# Patient Record
Sex: FEMALE | Race: BLACK OR AFRICAN AMERICAN
[De-identification: names, ages, dates, MRNs, and addresses within clinical notes are randomized per-mention and may not be internally consistent; named-entity substitution may affect disease eponyms.]

---

## 2017-11-25 ENCOUNTER — HOSPITAL ENCOUNTER (EMERGENCY)
Dept: HOSPITAL 62 - ER | Age: 27
Discharge: HOME | End: 2017-11-25
Payer: MEDICAID

## 2017-11-25 VITALS — SYSTOLIC BLOOD PRESSURE: 125 MMHG | DIASTOLIC BLOOD PRESSURE: 81 MMHG

## 2017-11-25 DIAGNOSIS — M54.41: Primary | ICD-10-CM

## 2017-11-25 DIAGNOSIS — F17.210: ICD-10-CM

## 2017-11-25 PROCEDURE — 96372 THER/PROPH/DIAG INJ SC/IM: CPT

## 2017-11-25 PROCEDURE — 99283 EMERGENCY DEPT VISIT LOW MDM: CPT

## 2017-11-25 NOTE — ER DOCUMENT REPORT
ED Neck/Back Problem





- General


Chief Complaint: Back Pain


Stated Complaint: BACK PAIN


Time Seen by Provider: 11/25/17 19:05


Mode of Arrival: Ambulatory


Information source: Patient


Notes: 





27-year-old female presents to ED for complaint of low back pain for last few 

days.  She denies any injury, denies any loss of control of bowel or bladder, 

denies any saddle anesthesia, denies any loss of sensation to the legs.  

Patient has had this pet pain in the past and was given narcotics before she 

moved to [place].  She states it was about a year ago.


TRAVEL OUTSIDE OF THE [place] IN LAST 30 DAYS: No





- HPI


Patient complains to provider of: Pain, Lower back.  No: Injury


Onset: Other


Onset: Chronic


Timing: Still present


Quality of pain: Achy, Sharp


Severity: Severe


Pain Level: 5


Context: Bending


Recent injury: No


Associated symptoms: Like prior neck/back pain, Lower back pain - Radiates to 

right buttocks.  denies: Constipation, Fever, Incontinence, Motor loss, Numbness

/tingling, Radiation to chest, Radiation to leg, Sensory loss, Sweaty, Upper 

back pain


Exacerbated by: Movement of trunk


Relieved by: Nothing


Similar symptoms previously: Yes


Recently seen / treated by doctor: No





- Related Data


Allergies/Adverse Reactions: 


 





No Known Allergies Allergy (Verified 11/25/17 18:08)


 











Past Medical History





- General


Information source: Patient





- Social History


Smoking Status: Current Every Day Smoker


Cigarette use (# per day): Yes


Chew tobacco use (# tins/day): No


Frequency of alcohol use: None


Drug Abuse: None


Lives with: Family


Family History: Arthritis, CAD, CVA, DM, Hyperlipidemia, Hypertension


Patient has suicidal ideation: No


Patient has homicidal ideation: No





- Past Medical History


Cardiac Medical History: Reports: None


Pulmonary Medical History: Reports: None


EENT Medical History: Reports: None


Neurological Medical History: Reports: None


Endocrine Medical History: Reports: None


Renal/ Medical History: Reports: None


Malignancy Medical History: Reports: None


GI Medical History: Reports: None


Musculoskeltal Medical History: Reports Hx Musculoskeletal Deformity, Reports 

Hx Musculoskeletal Trauma


Skin Medical History: Reports Hx Cellulitis


Psychiatric Medical History: Reports: None


Traumatic Medical History: Reports: Hx Fractures


Infectious Medical History: Reports: None


Past Surgical History: Reports: Hx Breast Surgery - Biopsy





- Immunizations


Immunizations up to date: Yes


Hx Diphtheria, Pertussis, Tetanus Vaccination: Yes - 2015





Review of Systems





- Review of Systems


Constitutional: No symptoms reported


EENT: No symptoms reported


Cardiovascular: No symptoms reported


Respiratory: No symptoms reported


Gastrointestinal: No symptoms reported


Genitourinary: No symptoms reported


Female Genitourinary: No symptoms reported


Musculoskeletal: Back pain, Muscle pain, Muscle stiffness


Skin: No symptoms reported


Hematologic/Lymphatic: No symptoms reported


Neurological/Psychological: No symptoms reported





Physical Exam





- Vital signs


Vitals: 


 











Temp Pulse Resp BP Pulse Ox


 


 98.6 F   87   18   135/72 H  99 


 


 11/25/17 18:04  11/25/17 18:04  11/25/17 18:04  11/25/17 18:04  11/25/17 18:04











Interpretation: Normal





- General


General appearance: Appears well, Alert





- HEENT


Head: Normocephalic, Atraumatic


Eyes: Normal


Pupils: PERRL





- Respiratory


Respiratory status: No respiratory distress


Chest status: Nontender


Breath sounds: Normal


Chest palpation: Normal





- Cardiovascular


Rhythm: Regular


Heart sounds: Normal auscultation


Murmur: No





- Abdominal


Inspection: Normal


Distension: No distension


Bowel sounds: Normal


Tenderness: Nontender


Organomegaly: No organomegaly





- Back


Back: Normal, Tender.  No: Deformity/step-off, CVA tenderness, Vertebra 

tenderness, Scars, Scoliosis, Wounds





- Extremities


General upper extremity: Normal inspection, Nontender, Normal color, Normal ROM

, Normal temperature


General lower extremity: Normal inspection, Nontender, Normal color, Normal ROM

, Normal temperature, Normal weight bearing.  No: Juliana's sign





- Neurological


Neuro grossly intact: Yes


Cognition: Normal


Orientation: AAOx4


[place] Coma Scale Eye Opening: Spontaneous


[place] Coma Scale Verbal: Oriented


[place] Coma Scale Motor: Obeys Commands


[place] Coma Scale Total: 15


Speech: Normal


Motor strength normal: LUE, RUE, LLE, RLE


Sensory: Normal





- Psychological


Associated symptoms: Normal affect, Normal mood





- Skin


Skin Temperature: Warm


Skin Moisture: Dry


Skin Color: Normal





Course





- Re-evaluation


Re-evalutation: 





11/25/17 19:25


Patient denies any loss control of bowel bladder, loss of control of legs, no 

saddle anesthesia, no decreased sensation to the leg.  Patient denies any signs 

and symptoms of cauda equina.  Patient has had similar pain in the past before 

moving to [place] stated they always gave her pain medicine.  I have 

reviewed the pain medicine policy with the patient and have given her Toradol 

and Decadron injections and a Lidoderm patch in the emergency room and 

instructed her to follow-up with primary doctor and back specialist for her 

pain.  Patient discharged home with prescription for ibuprofen.





- Vital Signs


Vital signs: 


 











Temp Pulse Resp BP Pulse Ox


 


 98.3 F   87   18   125/81   99 


 


 11/25/17 19:46  11/25/17 19:46  11/25/17 18:04  11/25/17 19:46  11/25/17 19:46














Discharge





- Discharge


Clinical Impression: 


Low back pain


Qualifiers:


 Chronicity: chronic Back pain laterality: right Sciatica presence: with 

sciatica Sciatica laterality: sciatica of right side Qualified Code(s): M54.41 

- Lumbago with sciatica, right side





Condition: Stable


Disposition: HOME, SELF-CARE


Instructions:  Family Physicians / Practices


Additional Instructions: 


LOW BACK PAIN:





     Three out of every four people will have an episode of disabling back pain 

during their lifetime.  Most commonly the pain is due to straining of the 

muscles and ligaments in the low back.


     Usual treatment includes: 


(1) Rest on a firm surface.  Avoid lying on your stomach.  


(2) Ice pack the painful area.  After a few days, gentle heat may be used 

intermittently to relax the area, or ice packs can be continued.  


(3) Medication may be needed -- muscle relaxers and antiinflammatory medicines 

are commonly used.  


(4) As the back improves, exercises are prescribed to strengthen the back and 

abdominal muscles.


     Your doctor will advise you on the proper care for your back at each stage 

in your recovery.  You may be better in a few days -- or healing may take 

several weeks.


     If new symptoms of a "herniated disc" (radiation of pain, numbness, or 

tingling down the back of the leg or weakness in the leg) occur, you should be 

re-examined.  Further testing may be necessary.





Toradol Injection





     You have been given an injection of ketorolac tromethamine (Toradol).  

This is an excellent, safe drug for pain control.  It also has potent 

antiinflammatory action.  You should have significant pain relief within about 

one hour.


     Toradol is not addicting and is non-sedating.  It does not interfere with 

driving or work.


     Call or return if you develop itching, hives, shortness of breath, or rash.





STEROID MEDICATION:





     You have been given an injection of medicine of the cortisone/steroid 

class.  This medication is used to control inflammation or allergy.  It is 

often continued as a pill for a short period of time, until the acute process 

subsides.


     There are usually no side effects from short-term use of cortisone-like 

medications.  Some persons feel an increased sense of well-being and are not 

sleepy at bedtime.  Long-term use of cortisone medications is best avoided, 

unless required for a severe condition.  If your condition does not remit, or 

relapses after the course of corticosteroid medication, you should consult your 

physician.





Stretching Soaks





     You have been instructed to begin range-of-motion exercises.  This 

activity stretches the skin as it heals.  Stretching soaks reduce stiffness 

from scar tissue.  Perform the exercises twice each day.


     Soak the wound area in a container of warm epsom salt water.  Use clean 

hot tap water (about the temperature of a very warm bath), mixing in about one (

1) teaspoon for every pint of water.  For example, a one-gallon pan half full 

of water needs four teaspoons (1/2 gal = 2 quarts = 4 pints).


     Soak the wound for about 10 minutes to numb it and soften any crusting.  

Then begin gently moving the joints through the normal direction of motion.  

Move back and forth, encouraging the joint to bend further and further, until 

you reach the point where you feel pain.  Do not try to push the joint beyond 

the point where you feel pain.


     After completing the exercises, let the wound dry briefly.  Apply a fresh 

dressing.








ICE PACKS:


     Apply ice packs frequently against the painful area.  Many different 

schedules are recommended, such as "20 minutes on, 20 minutes off" or "one hour 

ice, two hours rest."  If you need to work, you may need to go longer between 

ice treatments.  You should plan to have the area ice packed AT LEAST one 

fourth of the time.


     The ice should be applied over the wrap, tape, or splint, or over a layer 

of cloth -- not directly against the skin.  Some ice bags have a built-in cloth 

and can be put directly on the skin.





WARM PACKS:


     After approximately two days, apply gentle heat (such as a heating pad or 

hot water bottle) for about 20 to 30 minutes about every two hours -- at least 

four times daily.  Warmth and elevation will help you make a more rapid recovery

, and will ease the pain considerably.


     Do not use HOT heat, and never apply heat for longer than 30 minutes.  The 

continuous heat can invisibly damage skin and muscles -- even when no burn is 

seen on the surface.  Damaged muscles can make you MORE sore.





Use over-the-counter Lidoderm patches or Aspercreme to your back to help 

relieve the pain.  Please follow-up with your primary doctor as soon as he gets 

her Medicaid card and then schedule a pain management or a back specialist to 

continue to treat your back pain.








FOLLOW-UP CARE:


If you have been referred to a physician for follow-up care, call the physician

s office for an appointment as you were instructed or within the next two days.

  If you experience worsening or a significant change in your symptoms, notify 

the physician immediately or return to the Emergency Department at any time for 

re-evaluation.


Prescriptions: 


Ibuprofen 800 mg PO Q8HP PRN #20 tablet


 PRN Reason: 


Forms:  Elevated Blood Pressure, Smoking Cessation Education


Referrals: 


GARLAND COOK MD [ASSOCIATE] - Follow up as needed

## 2018-01-16 ENCOUNTER — HOSPITAL ENCOUNTER (EMERGENCY)
Dept: HOSPITAL 62 - ER | Age: 28
Discharge: HOME | End: 2018-01-16
Payer: MEDICAID

## 2018-01-16 VITALS — DIASTOLIC BLOOD PRESSURE: 82 MMHG | SYSTOLIC BLOOD PRESSURE: 116 MMHG

## 2018-01-16 DIAGNOSIS — M54.40: Primary | ICD-10-CM

## 2018-01-16 DIAGNOSIS — M79.604: ICD-10-CM

## 2018-01-16 DIAGNOSIS — G89.29: ICD-10-CM

## 2018-01-16 DIAGNOSIS — F17.200: ICD-10-CM

## 2018-01-16 DIAGNOSIS — R20.0: ICD-10-CM

## 2018-01-16 DIAGNOSIS — M79.605: ICD-10-CM

## 2018-01-16 PROCEDURE — 96372 THER/PROPH/DIAG INJ SC/IM: CPT

## 2018-01-16 PROCEDURE — 81025 URINE PREGNANCY TEST: CPT

## 2018-01-16 PROCEDURE — 72110 X-RAY EXAM L-2 SPINE 4/>VWS: CPT

## 2018-01-16 PROCEDURE — 99284 EMERGENCY DEPT VISIT MOD MDM: CPT

## 2018-01-16 NOTE — RADIOLOGY REPORT (SQ)
EXAM DESCRIPTION:  L SPINE WHOLE



COMPLETED DATE/TIME:  1/16/2018 5:40 pm



REASON FOR STUDY:  Lumbar spinal tenderness L1-L3 x 3 days w/ n/t lgs



COMPARISON:  None.



NUMBER OF VIEWS:  Five views including obliques.



TECHNIQUE:  AP, lateral, oblique, and sacral radiographic images acquired of the lumbar spine.



LIMITATIONS:  None.



FINDINGS:  MINERALIZATION: Normal.

SEGMENTATION: Transition vertebra is identified at the lumbar sacral junction

ALIGNMENT: Normal.

VERTEBRAE: Maintained height.  No fracture or worrisome bone lesion.

DISCS: Preserved height.  No significant osteophytes or end plate irregularity.

POSTERIOR ELEMENTS: Pedicles and facets are intact.  No pars defect or posterior arch defects.

HARDWARE: None in the spine.

PARASPINAL SOFT TISSUES: Normal.

PELVIS: Intact as visualized. No fractures or worrisome bone lesions. SI joints intact.

OTHER: IUD is projected in the mid pelvis



IMPRESSION:  No significant vertebral compression or disc space reduction is seen.  A transition vert
ebra is identified at the lumbosacral junction.  Other findings as noted above



TECHNICAL DOCUMENTATION:  JOB ID:  [id number]

 2011 [employer]- All Rights Reserved

## 2018-01-16 NOTE — ER DOCUMENT REPORT
ED General





- General


Chief Complaint: Low Back Pain


Stated Complaint: LOWER BACK PAIN


Time Seen by Provider: 01/16/18 16:00


Mode of Arrival: Ambulatory


Information source: Patient


TRAVEL OUTSIDE OF THE [place] IN LAST 30 DAYS: No





- HPI


Notes: 





27-year-old female presents today with complaints of an acute exacerbation of 

chronic back pain.  Patient states pain is 9 out of 10, sharp and shooting, 

constant.  Reports she has some numbness and tingling down bilateral lower 

extremities that does not extend past the knee.  Denies any issues with loss of 

control of bowel or bladder, denies any saddle anesthesia.  Patient states she 

has had a history of chronic back pain for the last 2 years since she had an 

epidural with her son.  States her lower back pain is never been managed when 

she was in [place], states she moved here, has not received a referral to a 

primary care or orthopedic specialist, although a referral was given for Dr. Sosa Hernandez MD on 11/25/17.  Patient never followed up on this.  Has any 

chest pain, shortness of breath, nausea, vomiting, diarrhea, pelvic pain, 

vaginal pain.  Denies any dysuria symptoms.





- Related Data


Allergies/Adverse Reactions: 


 





No Known Allergies Allergy (Verified 01/16/18 16:34)


 











Past Medical History





- General


Information source: Patient





- Social History


Smoking Status: Current Every Day Smoker


Family History: Arthritis, CAD, CVA, DM, Hyperlipidemia, Hypertension


Renal/ Medical History: Denies: Hx Ectopic Pregnancy, Hx Kidney Stones, Hx 

Ovarian Cysts, Hx Peritoneal Dialysis, Hx Pelvic Inflammatory Disease


Musculoskeltal Medical History: Reports Hx Musculoskeletal Deformity, Reports 

Hx Musculoskeletal Trauma


Skin Medical History: Reports Hx Cellulitis


Traumatic Medical History: Reports: Hx Fractures


Past Surgical History: Reports: Hx Breast Surgery - Biopsy





- Immunizations


Immunizations up to date: Yes


Hx Diphtheria, Pertussis, Tetanus Vaccination: Yes - 2015





Review of Systems





- Review of Systems


Constitutional: No symptoms reported


EENT: No symptoms reported


Cardiovascular: No symptoms reported


Respiratory: No symptoms reported


Gastrointestinal: No symptoms reported


Genitourinary: No symptoms reported


Female Genitourinary: No symptoms reported


Musculoskeletal: See HPI


Skin: No symptoms reported


Hematologic/Lymphatic: No symptoms reported


Neurological/Psychological: No symptoms reported





Physical Exam





- Vital signs


Vitals: 


 











Temp Pulse Resp BP Pulse Ox


 


 98.7 F   76   18   123/80   98 


 


 01/16/18 14:17  01/16/18 14:17  01/16/18 14:17  01/16/18 14:17  01/16/18 14:17











Interpretation: Normal





- Notes


Notes: 





PHYSICAL EXAMINATION:





GENERAL: Well-appearing, well-nourished and in no acute distress.





HEAD: Atraumatic, normocephalic.





EYES: Pupils equal round and reactive to light, extraocular movements intact, 

conjunctiva are normal.





ENT: Nares patent, oropharynx clear without exudates.  Moist mucous membranes.





NECK: Normal range of motion, supple without lymphadenopathy





LUNGS: Breath sounds clear to auscultation bilaterally and equal.  No wheezes 

rales or rhonchi.





HEART: Regular rate and rhythm without murmurs





ABDOMEN: Soft, nontender, nondistended abdomen.  No guarding, no rebound.  No 

masses appreciated.





Female : deferred





Musculoskeletal: Normal range of motion, no pitting or edema.  No cyanosis. 

straight leg test negative. Pain with flexion and extension at 30 degrees. 

Normal hip rotation. DTR +2 in BLE equally. Normal motor and sensory function 

in BLE equally. Distal pulses + 2 BLE equally. Noted paraspinal tenderness near 

L2 and L3. Noted spinal tenderness from L1-L3. No CVA tenderness bilaterally. 

Femoral pulses + 2 bilaterally and equally. No abrasions, scars, lacerations, 

ecchymosis of any recent trauma.





NEUROLOGICAL: Cranial nerves grossly intact.  Normal speech, normal gait.  

Normal sensory, motor exams





PSYCH: Normal mood, normal affect.





SKIN: Warm, Dry, normal turgor, no rashes or lesions noted.





Course





- Re-evaluation


Re-evalutation: 








Rechecked the patient who is resting comfortably. On re-exam, patient is 

symptomatically improved. Discussed the results of the labs/radiology as well 

as the diagnosis at great length.  The importance of following up with an 

orthopedic specialist, no acute fracture dislocation was noted.  Discussed the 

need to return to the ER for any new or worsening sx. Patient understands to 

take the Rx as directed. All questions answered. Patient comfortable with the 

decision to go home.





01/16/18 23:20








- Vital Signs


Vital signs: 


 











Temp Pulse Resp BP Pulse Ox


 


 97.8 F   71   16   116/82   99 


 


 01/16/18 18:06  01/16/18 18:06  01/16/18 18:06  01/16/18 18:06  01/16/18 18:06














Discharge





- Discharge


Clinical Impression: 


Acute bilateral low back pain


Qualifiers:


 Sciatica presence: with sciatica Sciatica laterality: sciatica laterality 

unspecified Qualified Code(s): M54.40 - Lumbago with sciatica, unspecified side





Condition: Good


Disposition: HOME, SELF-CARE


Instructions:  Low Back Pain ([facility]), Muscle Strain ([facility])


Additional Instructions: 


RICE therapy. prescribed Flexeril and meloxicam, advised pt not to take 

Flexeril while driving, operating heavy machinery or drinking etoh. Advised pt 

not to mix meloxicam with any other NSAID and to take with food. Discussed 

stretching exercises and how to manage sprains. 


and Tylenol on a staggered schedule 2 day   LOW BACK PAIN:





     Three out of every four people will have an episode of disabling back pain 

during their lifetime.  Most commonly the pain is due to straining of the 

muscles and ligaments in the low back.


     Usual treatment includes: 


(1) Rest on a firm surface.  Avoid lying on your stomach.  


(2) Ice pack the painful area.  After a few days, gentle heat may be used 

intermittently to relax the area, or ice packs can be continued.  


(3) Medication may be needed -- muscle relaxers and antiinflammatory medicines 

are commonly used.  


(4) As the back improves, exercises are prescribed to strengthen the back and 

abdominal muscles.


     Your doctor will advise you on the proper care for your back at each stage 

in your recovery.  You may be better in a few days -- or healing may take 

several weeks.


     If new symptoms of a "herniated disc" (radiation of pain, numbness, or 

tingling down the back of the leg or weakness in the leg) occur, you should be 

re-examined.  Further testing may be necessary.








PAIN MEDICATION INJECTION:


     You have received an injection of a pain medication.  You should 

experience significant pain relief within 45 minutes.  If this injection was a 

narcotic -- it will impair your judgement, slow your reaction time and make you 

sleepy (as well as relieve your pain).  Narcotics also can cause nausea.


     You should not drive, work with machinery, or perform any task requiring 

mental alertness until all effects of the medication are gone -- six to eight 

hours.  Do not take any alcohol, or sedatives, and do not take any other 

medication without checking with your physician.











MUSCLE RELAXERS: 


     Muscle relaxing medications are usually prescribed for acute muscle spasm 

or injury to the neck and back.  They are often combined with antiinflammatory 

pain medication for increased relief.


     You may stop the muscle relaxer when the pain and stiffness have improved.

  Start the medication again if spasms recur.  


     Muscle relaxers may cause drowsiness, especially with the first dose.  Do 

not operate machinery or drive while under the effects of the medication.  Most 

muscle relaxers last up to 24 hours.  Do not combine the medication with 

alcohol.








ICE PACKS:


     Apply ice packs frequently against the painful area.  Many different 

schedules are recommended, such as "20 minutes on, 20 minutes off" or "one hour 

ice, two hours rest."  If you need to work, you may need to go longer between 

ice treatments.  You should plan to have the area ice packed AT LEAST one 

fourth of the time.


     The ice should be applied over the wrap, tape, or splint, or over a layer 

of cloth -- not directly against the skin.  Some ice bags have a built-in cloth 

and can be put directly on the skin.





WARM PACKS:


     After approximately two days, apply gentle heat (such as a heating pad or 

hot water bottle) for about 20 to 30 minutes about every two hours -- at least 

four times daily.  Warmth and elevation will help you make a more rapid recovery

, and will ease the pain considerably.


     Do not use HOT heat, and never apply heat for longer than 30 minutes.  The 

continuous heat can invisibly damage skin and muscles -- even when no burn is 

seen on the surface.  Damaged muscles can make you MORE sore.








FOLLOW-UP CARE:


If you have been referred to a physician for follow-up care, call the physician

s office for an appointment as you were instructed or within the next two days.

  If you experience worsening or a significant change in your symptoms, notify 

the physician immediately or return to the Emergency Department at any time for 

re-evaluation.Chronic Pain Control





     Stress, inactivity, and depression make pain more severe regardless of the 

cause of the pain.  Stress and poor physical condition can cause pain such as 

headaches and backache.


     Relaxation:  Rest in a quiet place with your eyes closed for 20 minutes 

twice daily.  Concentrate on a pleasant image, or simply "feel" your breathing.

  Clear your mind.


     Stress management:  Deal with your "stressors."  Either take action, or 

eliminate the stressor from your life.  Don't let things hang over you.  Accept 

those things you can't change.


     Nutrition:  Eat small, balanced meals -- don't skip, don't overeat.  Meals 

should be high-carbohydrate, low-sugar, low-fat.


     Exercise:  Exercise helps painful conditions and eases stress. Get 30 

minutes of moderate exercise, five days a week. Do an activity that does not 

flare your pain.


     Precautions:  Pain which continues to disrupt daily activities, or which 

changes in nature, requires a medical evaluation.  Pain Clinic referral is 

available.





     


We do not manage chronic pain in the Emergency Department.  We will try to 

appropriately help you through an acute flare of your chronic painful condition

, but for on-going chronic pain that does not improve, you will need to see 

your private doctor or pain management specialist.  We do not provide repeated 

medication management of chronic painful conditions.  If you wish, we can 

provide the name of local pain management physicians.








Orthopedic Office


[facility]


[address] [place] [address]   


phone: [phone]








 advised to return to the ER if any signs or symptoms became worse.  Take over-

the-counter Motrin and Tylenol as needed for any fevers or pain.  Follow up 

with primary care within 1-2 days.  All questions and concerns answered by this 

provider. Patient/family  states would follow plan of care and agreed to plan 

of care. Patient was discharged home and off unit without incident. Please 

excuse any errors in this document was done by dragon dictation.








Prescriptions: 


Cyclobenzaprine HCl [Flexeril 10 mg Tablet] 10 mg PO TIDP PRN #9 tab


 PRN Reason: 


Referrals: 


RUDI ALBERTO DO [NO LOCAL MD] - Follow up as needed


CHERYL BRICENO MD [ACTIVE STAFF] - Follow up as needed

## 2018-05-16 ENCOUNTER — HOSPITAL ENCOUNTER (EMERGENCY)
Dept: HOSPITAL 62 - ER | Age: 28
Discharge: HOME | End: 2018-05-16
Payer: MEDICAID

## 2018-05-16 VITALS — SYSTOLIC BLOOD PRESSURE: 140 MMHG | DIASTOLIC BLOOD PRESSURE: 80 MMHG

## 2018-05-16 DIAGNOSIS — M54.42: Primary | ICD-10-CM

## 2018-05-16 PROCEDURE — 99283 EMERGENCY DEPT VISIT LOW MDM: CPT

## 2018-05-16 NOTE — ER DOCUMENT REPORT
ED General





- General


Chief Complaint: Low Back Pain


Stated Complaint: BACK PAIN


Time Seen by Provider: 05/16/18 22:45


Notes: 


Patient is a 27-year-old female who presents with complaint of back pain that 

is on the left side of her back.  She said she has had this pain for a long 

time and occasionally gets exacerbations of the pain which she is currently 

having.  No recent new traumas or injuries.  No leg weakness.  No leg numbness.

  No loss of bowel control.  No urinary retention.  No fevers.  No other 

complaints at this time.





TRAVEL OUTSIDE OF THE [place] IN LAST 30 DAYS: No





- Related Data


Allergies/Adverse Reactions: 


 





No Known Allergies Allergy (Verified 01/16/18 16:34)


 











Past Medical History





- Social History


Smoking Status: Never Smoker


Frequency of alcohol use: None


Drug Abuse: None


Family History: Arthritis, CAD, CVA, DM, Hyperlipidemia, Hypertension


Renal/ Medical History: Denies: Hx Ectopic Pregnancy, Hx Kidney Stones, Hx 

Ovarian Cysts, Hx Peritoneal Dialysis, Hx Pelvic Inflammatory Disease


Musculoskeltal Medical History: Reports Hx Musculoskeletal Deformity, Reports 

Hx Musculoskeletal Trauma


Skin Medical History: Reports Hx Cellulitis


Traumatic Medical History: Reports: Hx Fractures


Past Surgical History: Reports: Hx Breast Surgery - Biopsy





- Immunizations


Immunizations up to date: Yes


Hx Diphtheria, Pertussis, Tetanus Vaccination: Yes - 2015





Review of Systems





- Review of Systems


Notes: 





My Normal Review Basic





REVIEW OF SYSTEMS:


CONSTITUTIONAL :  Denies fever,  chills, or sweats.  Denies recent illness.


FEMALE  GENITOURINARY:  Denies vaginal bleeding, abnormal or irregular periods.

  


MUSCULOSKELETAL: Low back pain


SKIN:   Denies rash or skin lesions.


NEUROLOGICAL: Denies sensory or motor loss.


ALL OTHER SYSTEMS REVIEWED AND NEGATIVE.





Physical Exam





- Vital signs


Vitals: 


 











Temp Pulse Resp BP Pulse Ox


 


 98.6 F   104 H  18   140/80 H  98 


 


 05/16/18 22:24  05/16/18 22:24  05/16/18 22:24  05/16/18 22:24  05/16/18 22:24














- Notes


Notes: 





General Appearance: Well nourished, alert, cooperative, no acute distress, mild 

obvious discomfort.


Vitals: reviewed, See vital signs table.


Head: no swelling or tenderness to the head


Eyes: PERRL, EOMI, Conjuctiva clear


Back: Pain to palpation over the left lumbar paraspinal musculature.  No 

midline tenderness.  No pain to the right side.  Strength with plantar 

dorsiflexion against resistance.  Good distal sensation in the lower 

extremities.  Patellar reflexes are 1 out of 4 and equal bilaterally.


Extremities: strength 5/5 in all extremities, good pulses in all extremities, 

no swelling or tenderness in the extremities, no edema.


Skin: warm, dry, appropriate color, no rash


Neuro: speech clear, oriented x 3, normal affect, responds appropriately to 

questions.





Course





- Re-evaluation


Re-evalutation: 





05/16/18 23:00


Patient has acute exacerbation of her chronic low back pain.  She does not have 

any signs of spinal cord impingement.  I feel that she is safe to be discharged 

home.  I will give her a shot of Toradol and placed on Flexeril.  I will refer 

her to Dr. Perez for evaluation for further options such as physical therapy 

or injections.  Encouraged her return to ER medially if she has any signs of 

cauda equina syndrome, worsening pain, or she feels unwell.  Patient agrees 

with the plan will be discharged home.





Dictation of this chart was performed using voice recognition software; 

therefore, there may be some unintended grammatical errors.





- Vital Signs


Vital signs: 


 











Temp Pulse Resp BP Pulse Ox


 


 98.6 F   104 H  18   140/80 H  98 


 


 05/16/18 22:24  05/16/18 22:24  05/16/18 22:24  05/16/18 22:24  05/16/18 22:24














Discharge





- Discharge


Clinical Impression: 


Back pain


Qualifiers:


 Back pain location: low back pain Chronicity: chronic Back pain laterality: 

left Sciatica presence: with sciatica Sciatica laterality: sciatica of left 

side Qualified Code(s): M54.42 - Lumbago with sciatica, left side





Condition: Good


Disposition: HOME, SELF-CARE


Additional Instructions: 


LOW BACK PAIN:





     Three out of every four people will have an episode of disabling back pain 

during their lifetime.  Most commonly the pain is due to straining of the 

muscles and ligaments in the low back.


     Usual treatment includes: 


(1) Rest on a firm surface.  Avoid lying on your stomach.  


(2) Ice pack the painful area.  After a few days, gentle heat may be used 

intermittently to relax the area, or ice packs can be continued.  


(3) Medication may be needed -- muscle relaxers and antiinflammatory medicines 

are commonly used.  


(4) As the back improves, exercises are prescribed to strengthen the back and 

abdominal muscles.


     Your doctor will advise you on the proper care for your back at each stage 

in your recovery.  You may be better in a few days -- or healing may take 

several weeks.


     If new symptoms of a "herniated disc" (radiation of pain, numbness, or 

tingling down the back of the leg or weakness in the leg) occur, you should be 

re-examined.  Further testing may be necessary.








MUSCLE RELAXERS: 


     Muscle relaxing medications are usually prescribed for acute muscle spasm 

or injury to the neck and back.  They are often combined with antiinflammatory 

pain medication for increased relief.


     You may stop the muscle relaxer when the pain and stiffness have improved.

  Start the medication again if spasms recur.  


     Muscle relaxers may cause drowsiness, especially with the first dose.  Do 

not operate machinery or drive while under the effects of the medication.  Most 

muscle relaxers last up to 24 hours.  Do not combine the medication with 

alcohol.








ICE PACKS:


     Apply ice packs frequently against the painful area.  Many different 

schedules are recommended, such as "20 minutes on, 20 minutes off" or "one hour 

ice, two hours rest."  If you need to work, you may need to go longer between 

ice treatments.  You should plan to have the area ice packed AT LEAST one 

fourth of the time.


     The ice should be applied over the wrap, tape, or splint, or over a layer 

of cloth -- not directly against the skin.  Some ice bags have a built-in cloth 

and can be put directly on the skin.





WARM PACKS:


     After approximately two days, apply gentle heat (such as a heating pad or 

hot water bottle) for about 20 to 30 minutes about every two hours -- at least 

four times daily.  Warmth and elevation will help you make a more rapid recovery

, and will ease the pain considerably.


     Do not use HOT heat, and never apply heat for longer than 30 minutes.  The 

continuous heat can invisibly damage skin and muscles -- even when no burn is 

seen on the surface.  Damaged muscles can make you MORE sore.








FOLLOW-UP CARE:


If you have been referred to a physician for follow-up care, call the physician

s office for an appointment as you were instructed or within the next two days.

  If you experience worsening or a significant change in your symptoms, notify 

the physician immediately or return to the Emergency Department at any time for 

re-evaluation.








Please take Motrin 600 mg every 6 hours and Tylenol 500 mg every 4 hours.  This 

will help reduce inflammation in your back.  Please take the muscle relaxer as 

needed.  The muscle relaxer may make you little bit sleepy so please try not to 

drive when taking this medication.  Please follow-up with the pain management 

specialist, Dr. Jung, talked about further treatment options such as 

physical therapy or possible back injections if he feels that they will help 

after he evaluates you.  Please return to ER immediately if you have loss of 

bowel control, inability to urinate, weakness or numbness into her leg, or if 

your pain is intractable.


Prescriptions: 


Cyclobenzaprine HCl [Flexeril 10 mg Tablet] 10 mg PO TIDP PRN #15 tab


 PRN Reason: 


Forms:  Return to Work


Referrals: 


MATY PEREZ MD [ACTIVE STAFF] - Follow up in 3-5 days

## 2018-05-25 ENCOUNTER — HOSPITAL ENCOUNTER (EMERGENCY)
Dept: HOSPITAL 62 - ER | Age: 28
Discharge: HOME | End: 2018-05-25
Payer: MEDICAID

## 2018-05-25 VITALS — DIASTOLIC BLOOD PRESSURE: 81 MMHG | SYSTOLIC BLOOD PRESSURE: 128 MMHG

## 2018-05-25 DIAGNOSIS — M54.5: ICD-10-CM

## 2018-05-25 DIAGNOSIS — G89.29: Primary | ICD-10-CM

## 2018-05-25 DIAGNOSIS — F17.210: ICD-10-CM

## 2018-05-25 PROCEDURE — 99406 BEHAV CHNG SMOKING 3-10 MIN: CPT

## 2018-05-25 PROCEDURE — 99283 EMERGENCY DEPT VISIT LOW MDM: CPT

## 2018-05-25 NOTE — ER DOCUMENT REPORT
ED Neck/Back Problem





- General


Chief Complaint: Back Pain


Stated Complaint: BACK PAIN


Time Seen by Provider: 05/25/18 17:27


Mode of Arrival: Ambulatory


Information source: Patient


Notes: 





27-year-old female presented to ED for complaint of back pain.  She has the 

same back pain that she has had for over a year.  She states she has not 

injured herself she is not fallen she has no change in her pain.  She states 

sometimes when she gets up she has pain that makes her sit back down.  She 

denies falling at any time.  He denies any loss of sensation to the saddle 

region, she denies any loss of control of bowel bladder, she denies any loss 

control of her legs.  She can walk with a even steady gait.


TRAVEL OUTSIDE OF THE [place] IN LAST 30 DAYS: No





- HPI


Patient complains to provider of: Pain, Lower back


Onset: Other - Patient has had this pain off and on for over a year


Onset: Chronic


Timing: Waxing and waning, Still present


Quality of pain: Sharp


Severity: Moderate


Pain Level: 3


Context: Bending, Lifting


Recent injury: No


Associated symptoms: Like prior neck/back pain, Lower back pain.  denies: 

Constipation, Motor loss, Numbness/tingling, Radiation to arm, Radiation to 

chest, Radiation to leg, Sensory loss, Sweaty, Unable to urinate


Exacerbated by: Movement of trunk


Relieved by: Nothing


Similar symptoms previously: Yes


Recently seen / treated by doctor: Yes





- Related Data


Allergies/Adverse Reactions: 


 





No Known Allergies Allergy (Verified 05/25/18 17:23)


 











Past Medical History





- General


Information source: Patient





- Social History


Smoking Status: Current Every Day Smoker


Cigarette use (# per day): Yes - 1/2 ppd


Chew tobacco use (# tins/day): No


Smoking Education Provided: Yes - 4 min


Frequency of alcohol use: Social - 2 x a week


Drug Abuse: None


Occupation: call center


Family History: Arthritis, CAD, CVA, DM, Hyperlipidemia, Hypertension


Patient has suicidal ideation: No


Patient has homicidal ideation: No





- Past Medical History


Cardiac Medical History: Reports: None


Pulmonary Medical History: Reports: None


EENT Medical History: Reports: None


Neurological Medical History: Reports: None


Endocrine Medical History: Reports: None


Renal/ Medical History: Reports: None


Malignancy Medical History: Reports: None


GI Medical History: Reports: None


Musculoskeltal Medical History: Reports Hx Musculoskeletal Deformity, Reports 

Hx Musculoskeletal Trauma - ankle fracture


Skin Medical History: Reports Hx Cellulitis


Psychiatric Medical History: Reports: None


Traumatic Medical History: Reports: Hx Fractures - ankle


Infectious Medical History: Reports: None


Past Surgical History: Reports: Hx Breast Surgery - Biopsy





- Immunizations


Immunizations up to date: Yes


Hx Diphtheria, Pertussis, Tetanus Vaccination: Yes - 2015





Review of Systems





- Review of Systems


Constitutional: No symptoms reported


EENT: No symptoms reported


Cardiovascular: No symptoms reported


Respiratory: No symptoms reported


Gastrointestinal: No symptoms reported


Genitourinary: No symptoms reported


Female Genitourinary: No symptoms reported


Musculoskeletal: Back pain, Muscle pain, Muscle stiffness


Skin: No symptoms reported


Hematologic/Lymphatic: No symptoms reported


Neurological/Psychological: No symptoms reported





Physical Exam





- Vital signs


Vitals: 





 











Temp Pulse Resp BP Pulse Ox


 


 98.6 F   108 H  18   128/81 H  97 


 


 05/25/18 17:21  05/25/18 17:21  05/25/18 17:21  05/25/18 17:21  05/25/18 17:21











Interpretation: Normal





- General


General appearance: Appears well, Alert





- HEENT


Head: Normocephalic, Atraumatic


Eyes: Normal


Pupils: PERRL





- Respiratory


Respiratory status: No respiratory distress


Chest status: Nontender


Breath sounds: Normal


Chest palpation: Normal





- Cardiovascular


Rhythm: Regular


Heart sounds: Normal auscultation


Murmur: No





- Abdominal


Inspection: Normal


Distension: No distension


Bowel sounds: Normal


Tenderness: Nontender


Organomegaly: No organomegaly





- Back


Back: Normal, Tender.  No: Deformity/step-off, CVA tenderness, Vertebra 

tenderness, Scars, Scoliosis





- Extremities


General upper extremity: Normal inspection, Nontender, Normal color, Normal ROM

, Normal temperature


General lower extremity: Normal inspection, Nontender, Normal color, Normal ROM

, Normal temperature, Normal weight bearing.  No: Juliana's sign





- Neurological


Neuro grossly intact: Yes


Cognition: Normal


Orientation: AAOx4


Shefali Coma Scale Eye Opening: Spontaneous


Theodore Coma Scale Verbal: Oriented


Shefali Coma Scale Motor: Obeys Commands


Theodore Coma Scale Total: 15


Speech: Normal


Motor strength normal: LUE, RUE, LLE, RLE


Sensory: Normal





- Psychological


Associated symptoms: Normal affect, Normal mood





- Skin


Skin Temperature: Warm


Skin Moisture: Dry


Skin Color: Normal





Course





- Re-evaluation


Re-evalutation: 





05/25/18 17:54


After performing a Medical Screening Examination, I estimate there is LOW risk 

for EXPANDING OR RUPTURED ABDOMINAL AORTIC ANEURYSM, CAUDA EQUINA SYNDROME, 

EPIDURAL MASS LESION, or HERNIATED DISK CAUSING SEVERE SPINAL STENOSIS, thus I 

consider the discharge disposition reasonable.  I have reevaluated this patient 

multiple times and no significant life threatening changes are noted. The 

patient  and I have discussed the diagnosis and risks, and we agree with 

discharging home and close follow-up. We also discussed returning to the 

Emergency Department immediately if new or worsening symptoms occur with the 

understanding that symptoms and presentations can change. We have discussed the 

symptoms which are most concerning (e.g., saddle anesthesia, urinary or bowel 

incontinence or retention, changing or worsening pain) that necessitate 

immediate return.





- Vital Signs


Vital signs: 





 











Temp Pulse Resp BP Pulse Ox


 


 98.6 F   108 H  18   128/81 H  97 


 


 05/25/18 17:21  05/25/18 17:21  05/25/18 17:21  05/25/18 17:21  05/25/18 17:21














Discharge





- Discharge


Clinical Impression: 


Chronic low back pain


Qualifiers:


 Back pain laterality: unspecified Sciatica presence: without sciatica 

Qualified Code(s): M54.5 - Low back pain; G89.29 - Other chronic pain; G89.29 - 

Other chronic pain





Condition: Stable


Disposition: HOME, SELF-CARE


Instructions:  Family Physicians / Practices


Additional Instructions: 


LOW BACK PAIN:





     Three out of every four people will have an episode of disabling back pain 

during their lifetime.  Most commonly the pain is due to straining of the 

muscles and ligaments in the low back.


     Usual treatment includes: 


(1) Rest on a firm surface.  Avoid lying on your stomach.  


(2) Ice pack the painful area.  After a few days, gentle heat may be used 

intermittently to relax the area, or ice packs can be continued.  


(3) Medication may be needed -- muscle relaxers and antiinflammatory medicines 

are commonly used.  


(4) As the back improves, exercises are prescribed to strengthen the back and 

abdominal muscles.


     Your doctor will advise you on the proper care for your back at each stage 

in your recovery.  You may be better in a few days -- or healing may take 

several weeks.


     If new symptoms of a "herniated disc" (radiation of pain, numbness, or 

tingling down the back of the leg or weakness in the leg) occur, you should be 

re-examined.  Further testing may be necessary.





Chronic Back Pain





     Chronic back pain (pain persisting longer than three months) is a common 

problem. A medical evaluation can look for herniated disc, arthritis, 

osteoporosis, tumors, and infections. But at least half the time, there's no 

obvious treatable cause. Anxiety and depression tend to worsen back pain.


     Ibuprofen or other anti-inflammatory medicine can help. A heating pad, 

used for 15-20 minutes at a time, can ease pain. For this type of back pain, 

narcotic medicines should be avoided. Muscle relaxers are rarely helpful unless 

you're having spasms.


     Activity is important. Find an aerobic exercise program that your back can 

tolerate. Too much rest makes back pain worse. Specific back exercises are 

usually prescribed to strengthen the back and abdominal muscles. Often, a 

physical therapist can help. Avoid heavy lifting, working while bent over, or 

standing with both knees straight.


     Most back pain patients do better with a firm mattress.


     If new symptoms of a "herniated disc" (radiation of pain, numbness, or 

tingling down the back of the leg or weakness in the leg) occur, you should be 

re-examined.





Chronic Pain Control





     Stress, inactivity, and depression make pain more severe regardless of the 

cause of the pain.  Stress and poor physical condition can cause pain such as 

headaches and backache.


     Relaxation:  Rest in a quiet place with your eyes closed for 20 minutes 

twice daily.  Concentrate on a pleasant image, or simply "feel" your breathing.

  Clear your mind.


     Stress management:  Deal with your "stressors."  Either take action, or 

eliminate the stressor from your life.  Don't let things hang over you.  Accept 

those things you can't change.


     Nutrition:  Eat small, balanced meals -- don't skip, don't overeat.  Meals 

should be high-carbohydrate, low-sugar, low-fat.


     Exercise:  Exercise helps painful conditions and eases stress. Get 30 

minutes of moderate exercise, five days a week. Do an activity that does not 

flare your pain.


     Precautions:  Pain which continues to disrupt daily activities, or which 

changes in nature, requires a medical evaluation.  Pain Clinic referral is 

available.





     


We do not manage chronic pain in the Emergency Department.  We will try to 

appropriately help you through an acute flare of your chronic painful condition

, but for on-going chronic pain that does not improve, you will need to see 

your private doctor or pain management specialist.  We do not provide repeated 

medication management of chronic painful conditions.  If you wish, we can 

provide the name of local pain management physicians.





MUSCLE RELAXERS: 


     Muscle relaxing medications are usually prescribed for acute muscle spasm 

or injury to the neck and back.  They are often combined with antiinflammatory 

pain medication for increased relief.


     You may stop the muscle relaxer when the pain and stiffness have improved.

  Start the medication again if spasms recur.  


     Muscle relaxers may cause drowsiness, especially with the first dose.  Do 

not operate machinery or drive while under the effects of the medication.  Most 

muscle relaxers last up to 24 hours.  Do not combine the medication with 

alcohol.








ICE PACKS:


     Apply ice packs frequently against the painful area.  Many different 

schedules are recommended, such as "20 minutes on, 20 minutes off" or "one hour 

ice, two hours rest."  If you need to work, you may need to go longer between 

ice treatments.  You should plan to have the area ice packed AT LEAST one 

fourth of the time.


     The ice should be applied over the wrap, tape, or splint, or over a layer 

of cloth -- not directly against the skin.  Some ice bags have a built-in cloth 

and can be put directly on the skin.





WARM PACKS:


     After approximately two days, apply gentle heat (such as a heating pad or 

hot water bottle) for about 20 to 30 minutes about every two hours -- at least 

four times daily.  Warmth and elevation will help you make a more rapid recovery

, and will ease the pain considerably.


     Do not use HOT heat, and never apply heat for longer than 30 minutes.  The 

continuous heat can invisibly damage skin and muscles -- even when no burn is 

seen on the surface.  Damaged muscles can make you MORE sore.





Stretching Exercises for the Back





     The physician has recommended that you begin stretching exercises for your 

back.  These are often used even while the back is painful. However, you should 

notify the physician if the activities seem to increase your pain.


     PELVIC TILT:  Lie flat on your back with knees bent.  Tighten your stomach 

and buttock muscles so it flattens your lower back against the floor.  Hold 10 

seconds.  Repeat 10 times, twice daily.


     KNEE RAISE:  Lying on the back with knees bent, raise one knee to your 

chest, then the other.  Hold both knees against the chest 10 seconds, then 

lower one knee at a time.  Repeat 10 times, twice daily.


     PARTIAL TRUNK RAISE:  Lie face down, arms at your sides.  Keeping your 

waist on the floor, use your arms raise your chest up.  Support yourself on 

your elbows for 30 seconds.  Repeat twice daily, increasing the time to two 

minutes as you recover.





Anti-Inflammatory Medication





     You have received a prescription for an antiinflammatory agent. This is an 

excellent, safe drug for pain control.  In addition, it has potent 

antiinflammatory effects which are beneficial, especially in the treatment of 

injuries, arthritis, or tendonitis.


     It's best to take this medicine with food.  Persons with ulcer disease or 

allergy to aspirin should notify their physician of this before taking this 

drug.


     Take the medication exactly as prescribed.  Don't take additional doses 

unless instructed to do so by your doctor.  If you develop wheezing, shortness 

of breath, hives, faintness, stomach pain, vomiting, or dark black stools, 

return for re-evaluation at once.





FOLLOW-UP CARE:


If you have been referred to a physician for follow-up care, call the physician

s office for an appointment as you were instructed or within the next two days.

  If you experience worsening or a significant change in your symptoms, notify 

the physician immediately or return to the Emergency Department at any time for 

re-evaluation.


Prescriptions: 


Methocarbamol [Robaxin 500 mg Tablet] 500 mg PO BIDP PRN #14 tablet


 PRN Reason: 


Naproxen 500 mg PO BIDP PRN #14 tablet


 PRN Reason: 


Forms:  Elevated Blood Pressure, Smoking Cessation Education, Return to Work

## 2018-06-10 ENCOUNTER — HOSPITAL ENCOUNTER (EMERGENCY)
Dept: HOSPITAL 62 - ER | Age: 28
Discharge: HOME | End: 2018-06-10
Payer: MEDICAID

## 2018-06-10 VITALS — SYSTOLIC BLOOD PRESSURE: 126 MMHG | DIASTOLIC BLOOD PRESSURE: 80 MMHG

## 2018-06-10 DIAGNOSIS — F17.210: ICD-10-CM

## 2018-06-10 DIAGNOSIS — M54.42: ICD-10-CM

## 2018-06-10 DIAGNOSIS — G89.29: Primary | ICD-10-CM

## 2018-06-10 DIAGNOSIS — Z71.6: ICD-10-CM

## 2018-06-10 PROCEDURE — 99283 EMERGENCY DEPT VISIT LOW MDM: CPT

## 2018-06-10 PROCEDURE — 96374 THER/PROPH/DIAG INJ IV PUSH: CPT

## 2018-06-10 PROCEDURE — 96372 THER/PROPH/DIAG INJ SC/IM: CPT

## 2018-06-10 PROCEDURE — 99406 BEHAV CHNG SMOKING 3-10 MIN: CPT

## 2018-06-10 NOTE — ER DOCUMENT REPORT
ED General





- General


Chief Complaint: Low Back Pain


Stated Complaint: BACK PAIN


Time Seen by Provider: 06/10/18 05:16


Mode of Arrival: Ambulatory


Information source: Patient, [facility] Records


Notes: 





27-year-old female with chronic back pain presents with complaint of low back 

pain that has been present for over 6 months but worsened tonight.  Pain is 

located in the left paraspinal musculature of the lumbar spine.  She describes 

it as a pulling, throbbing pain that radiates down her left leg.  She denies 

any initial injury, recent injury.  She states that the only thing she can 

think of is that the pain occurred after receiving an epidural 1 year ago for 

the birth of her child.  Patient denies fever, saddle anesthesia, urinary 

retention, fecal incontinence, fever, history of IV drug use.  Patient has been 

seen multiple times and states that she was referred to a physician but has not 

been able to see him yet.  She does have an upcoming appointment in July.  She 

has not tried anything for this pain.


TRAVEL OUTSIDE OF THE [place] IN LAST 30 DAYS: No





- HPI


Onset: Other


Onset/Duration: Intermittent, Worse


Quality of pain: Throbbing, Other - Pulling


Severity: Moderate


Pain Level: 2


Associated symptoms: denies: Chest pain, Fever, Nausea, Vomiting, Shortness of 

breath


Exacerbated by: Movement


Relieved by: Denies


Similar symptoms previously: Yes


Recently seen / treated by doctor: Yes





- Related Data


Allergies/Adverse Reactions: 


 





No Known Allergies Allergy (Verified 06/10/18 04:52)


 











Past Medical History





- General


Information source: Patient





- Social History


Smoking Status: Current Every Day Smoker


Cigarette use (# per day): Yes - 10-12


Smoking Education Provided: Yes - Patient counselled regarding cessation for 4 

minutes


Frequency of alcohol use: Occasional


Drug Abuse: None


Lives with: Family


Family History: Arthritis, CAD, CVA, DM, Hyperlipidemia, Hypertension


Patient has suicidal ideation: No


Patient has homicidal ideation: No


Renal/ Medical History: Denies: Hx Ectopic Pregnancy, Hx Kidney Stones, Hx 

Ovarian Cysts, Hx Peritoneal Dialysis, Hx Pelvic Inflammatory Disease


Musculoskeltal Medical History: Reports Hx Musculoskeletal Deformity, Reports 

Hx Musculoskeletal Trauma - ankle fracture


Skin Medical History: Reports Hx Cellulitis


Traumatic Medical History: Reports: Hx Fractures - ankle


Past Surgical History: Reports: Hx Breast Surgery - Biopsy





- Immunizations


Immunizations up to date: Yes


Hx Diphtheria, Pertussis, Tetanus Vaccination: Yes - 2015





Review of Systems





- Review of Systems


Notes: 





REVIEW OF SYSTEMS:


CONSTITUTIONAL :  Denies fever,  chills, or sweats.  Denies recent illness. 

Denies weight loss, recent hospitalizations. 


EENT: Denies visula changes, eye pain.    Denies nasal or sinus congestion or 

discharge.  Denies sore throat, oral lesions, difficulty swallowing.


CARDIOVASCULAR:  Denies chest pain.  Denies palpitations or racing or irregular 

heart beat.  Denies lower extremity edema.


RESPIRATORY:  Denies cough, cold, or chest congestion.  Denies shortness of 

breath, difficulty breathing, or wheezing.


GASTROINTESTINAL:  Denies abdominal pain or distention.  Denies nausea, vomiting

, or diarrhea.  Denies blood in vomitus, stools, or per rectum.  Denies black, 

tarry stools.  Denies constipation.  


GENITOURINARY:  Denies difficulty urinating, painful urination, burning, 

frequency, blood in urine, or  vaginal discharge.


MUSCULOSKELETAL:  Denies  neck pain or stiffness.  Denies joint pain or 

swelling.


SKIN:   Denies rash, lesions or sores.


HEMATOLOGIC :   Denies easy bruising or bleeding.


LYMPHATIC:  Denies swollen, enlarged glands.


NEUROLOGICAL:  Denies confusion or altered mental status.  Denies passing out 

or loss of consciousness.  Denies dizziness or lightheadedness.  Denies 

headache.  Denies weakness or paralysis or loss of use of either side.  Denies 

problems with gait or speech.  Denies sensory loss, numbness, or tingling.  

Denies seizures.


PSYCHIATRIC:  Denies anxiety or stress.  Denies depression, suicidal ideation, 

or homicidal ideation.








Physical Exam





- Vital signs


Vitals: 


 











Temp Pulse Resp BP Pulse Ox


 


 98.0 F   105 H  20   141/70 H  100 


 


 06/10/18 04:56  06/10/18 04:56  06/10/18 04:56  06/10/18 04:56  06/10/18 04:56














- Notes


Notes: 





PHYSICAL EXAMINATION:





GENERAL: Well-appearing, well-nourished and in no acute distress.





HEAD: Atraumatic, normocephalic.





EYES: Pupils equal round and reactive to light, extraocular movements intact, 

conjunctiva are normal.





ENT: Nares patent, oropharynx clear without exudates.  Moist mucous membranes.





NECK: Normal range of motion, supple without lymphadenopathy





LUNGS: Breath sounds clear to auscultation bilaterally and equal.  No wheezes 

rales or rhonchi.





HEART: Regular rate and rhythm without murmurs





ABDOMEN: Soft, nontender, nondistended abdomen.  No guarding, no rebound.  No 

masses appreciated.





Female : deferred





Musculoskeletal: Normal range of motion, no pitting or edema.  No cyanosis.  

Tender to palpation over the left buttocks.  No midline tenderness.





NEUROLOGICAL: Cranial nerves grossly intact.  Normal speech, normal gait.  

Normal sensory, motor exams





PSYCH: Normal mood, normal affect.





SKIN: Warm, Dry, normal turgor, no rashes or lesions noted.





Course





- Re-evaluation


Re-evalutation: 





06/10/18 05:43


27-year-old female with a history of chronic low back pain presents with an 

exacerbation of her typical low back pain which is located in the paraspinal 

musculature of the lumbar spine with tenderness to palpation over the sciatic 

notch.  Patient has no red flag symptoms including fever, weakness, saddle 

anesthesia, urinary retention, fecal incontinence, history of IV drug use.  

Upon arrival vitals reviewed and within normal limits.  Patient is tearful 

secondary to pain.  Exam not consistent with epidural abscess, cauda equina.  

Patient received IM fentanyl, Toradol and Flexeril during her ED course.  On 

reevaluation patient does report an improvement of pain.  She does have an 

upcoming appointment with a physician.  Patient advised to ice the area, remain 

active, stretch when possible.  Patient provided the opportunity to ask 

questions, and express concerns.  Discharge instructions discussed. Patient is 

agreeable with discharge home.  Return indications explained and discussed with 

the patient who displays understanding.  Patient encouraged to return to the 

emergency department immediately with any concerns.





- Vital Signs


Vital signs: 


 











Temp Pulse Resp BP Pulse Ox


 


 98.0 F   105 H  20   141/70 H  100 


 


 06/10/18 04:56  06/10/18 04:56  06/10/18 04:56  06/10/18 04:56  06/10/18 04:56














Discharge





- Discharge


Clinical Impression: 


Low back pain


Qualifiers:


 Chronicity: chronic Back pain laterality: left Sciatica presence: with 

sciatica Sciatica laterality: sciatica of left side Qualified Code(s): M54.42 - 

Lumbago with sciatica, left side; G89.29 - Other chronic pain; G89.29 - Other 

chronic pain





Condition: Good


Disposition: HOME, SELF-CARE


Instructions:  Ice Packs ([facility]), Low Back Pain ([facility]), Pain Medication Injection (

[facility])


Additional Instructions: 


Follow up with your physician tomorrow for further care or return to the ED 

IMMEDIATELY if symptoms worsen or new concerns occur. If you cannot afford to 

follow up with your primary care physician a list of low cost clinics have been 

provided at the end of your discharge papers as well.


Prescriptions: 


Cyclobenzaprine HCl [Flexeril 10 mg Tablet] 10 mg PO TIDP PRN #15 tab


 PRN Reason: 


Ibuprofen [Motrin 600 Mg Tablet] 600 mg PO TID #15 tablet


Methylprednisolone [Medrol Dosepack (4 mg/Tab) 21 Tab/Dosepak] 4 mg PO ASDIR 

PRN #21 tab.ds.pk


 PRN Reason: 


Forms:  Elevated Blood Pressure

## 2018-06-19 ENCOUNTER — HOSPITAL ENCOUNTER (EMERGENCY)
Dept: HOSPITAL 62 - ER | Age: 28
Discharge: HOME | End: 2018-06-19
Payer: MEDICAID

## 2018-06-19 VITALS — SYSTOLIC BLOOD PRESSURE: 111 MMHG | DIASTOLIC BLOOD PRESSURE: 61 MMHG

## 2018-06-19 DIAGNOSIS — R10.9: ICD-10-CM

## 2018-06-19 DIAGNOSIS — G89.29: Primary | ICD-10-CM

## 2018-06-19 DIAGNOSIS — M54.5: ICD-10-CM

## 2018-06-19 DIAGNOSIS — R03.0: ICD-10-CM

## 2018-06-19 LAB
ADD MANUAL DIFF: NO
ALBUMIN SERPL-MCNC: 4.4 G/DL (ref 3.5–5)
ALP SERPL-CCNC: 69 U/L (ref 38–126)
ALT SERPL-CCNC: 24 U/L (ref 9–52)
ANION GAP SERPL CALC-SCNC: 11 MMOL/L (ref 5–19)
APPEARANCE UR: CLEAR
APTT PPP: YELLOW S
AST SERPL-CCNC: 19 U/L (ref 14–36)
BASOPHILS # BLD AUTO: 0.1 10^3/UL (ref 0–0.2)
BASOPHILS NFR BLD AUTO: 0.8 % (ref 0–2)
BILIRUB DIRECT SERPL-MCNC: 0.3 MG/DL (ref 0–0.4)
BILIRUB SERPL-MCNC: 0.5 MG/DL (ref 0.2–1.3)
BILIRUB UR QL STRIP: NEGATIVE
BUN SERPL-MCNC: 12 MG/DL (ref 7–20)
CALCIUM: 10 MG/DL (ref 8.4–10.2)
CHLORIDE SERPL-SCNC: 105 MMOL/L (ref 98–107)
CO2 SERPL-SCNC: 29 MMOL/L (ref 22–30)
EOSINOPHIL # BLD AUTO: 0.3 10^3/UL (ref 0–0.6)
EOSINOPHIL NFR BLD AUTO: 2.8 % (ref 0–6)
ERYTHROCYTE [DISTWIDTH] IN BLOOD BY AUTOMATED COUNT: 16.1 % (ref 11.5–14)
GLUCOSE SERPL-MCNC: 90 MG/DL (ref 75–110)
GLUCOSE UR STRIP-MCNC: NEGATIVE MG/DL
HCT VFR BLD CALC: 39 % (ref 36–47)
HGB BLD-MCNC: 12.4 G/DL (ref 12–15.5)
KETONES UR STRIP-MCNC: NEGATIVE MG/DL
LIPASE SERPL-CCNC: 40 U/L (ref 23–300)
LYMPHOCYTES # BLD AUTO: 3.4 10^3/UL (ref 0.5–4.7)
LYMPHOCYTES NFR BLD AUTO: 37.4 % (ref 13–45)
MCH RBC QN AUTO: 25.6 PG (ref 27–33.4)
MCHC RBC AUTO-ENTMCNC: 32 G/DL (ref 32–36)
MCV RBC AUTO: 80 FL (ref 80–97)
MONOCYTES # BLD AUTO: 0.8 10^3/UL (ref 0.1–1.4)
MONOCYTES NFR BLD AUTO: 9 % (ref 3–13)
NEUTROPHILS # BLD AUTO: 4.5 10^3/UL (ref 1.7–8.2)
NEUTS SEG NFR BLD AUTO: 50 % (ref 42–78)
NITRITE UR QL STRIP: NEGATIVE
PH UR STRIP: 8 [PH] (ref 5–9)
PLATELET # BLD: 316 10^3/UL (ref 150–450)
POTASSIUM SERPL-SCNC: 4.6 MMOL/L (ref 3.6–5)
PROT SERPL-MCNC: 8.5 G/DL (ref 6.3–8.2)
PROT UR STRIP-MCNC: NEGATIVE MG/DL
RBC # BLD AUTO: 4.86 10^6/UL (ref 3.72–5.28)
SODIUM SERPL-SCNC: 144.6 MMOL/L (ref 137–145)
SP GR UR STRIP: 1.01
TOTAL CELLS COUNTED % (AUTO): 100 %
UROBILINOGEN UR-MCNC: NEGATIVE MG/DL (ref ?–2)
WBC # BLD AUTO: 9 10^3/UL (ref 4–10.5)

## 2018-06-19 PROCEDURE — 74176 CT ABD & PELVIS W/O CONTRAST: CPT

## 2018-06-19 PROCEDURE — 96374 THER/PROPH/DIAG INJ IV PUSH: CPT

## 2018-06-19 PROCEDURE — 83690 ASSAY OF LIPASE: CPT

## 2018-06-19 PROCEDURE — 85025 COMPLETE CBC W/AUTO DIFF WBC: CPT

## 2018-06-19 PROCEDURE — 36415 COLL VENOUS BLD VENIPUNCTURE: CPT

## 2018-06-19 PROCEDURE — 80053 COMPREHEN METABOLIC PANEL: CPT

## 2018-06-19 PROCEDURE — 99284 EMERGENCY DEPT VISIT MOD MDM: CPT

## 2018-06-19 PROCEDURE — 81001 URINALYSIS AUTO W/SCOPE: CPT

## 2018-06-19 PROCEDURE — 81025 URINE PREGNANCY TEST: CPT

## 2018-06-19 NOTE — ER DOCUMENT REPORT
ED GI/





- General


Chief Complaint: Flank Pain


Stated Complaint: FLANK PAIN


Time Seen by Provider: 06/19/18 14:08


Mode of Arrival: Ambulatory


Information source: Patient


TRAVEL OUTSIDE OF THE [place] IN LAST 30 DAYS: No





- HPI


Patient complains to provider of: Other - back pain


Notes: 





06/19/18 14:28


Patient is here with complaints of left low back pain.  The patient has been 

having this pain for several months.  She has been seen here several times for 

the same pain.  She has had lumbar x-rays done in the past that were 

unremarkable.  She was seen at [facility] and was told that she has kidney stones that 

she thinks that the pain she is having is related to her kidney stones more 

than a musculoskeletal issue.  Pain is located in the left lower back.  It 

occasionally shoots across her lower back into the right side.  It is worse 

with movement of her legs as well as her back.  She denies any trauma or fall.  

She denies abdominal pain.  She denies nausea, vomiting, diarrhea.  No dysuria 

or hematuria.  She states that it week ago she had to urinate and was not able 

to get out of bed fast enough and lost control of her bladder at that time, but 

she has not had any further episodes of bowel or bladder dysfunction.  She 

denies fevers.  She denies blood thinners.  She denies IV drug use.  She is not 

immunosuppressed.  She denies any numbness, Marce, weakness to the legs.  No 

chest pain or shortness of breath.  No other complaints at this time.  She does 

have a an appointment with primary care scheduled in the next couple of weeks.





- Related Data


Allergies/Adverse Reactions: 


 





No Known Allergies Allergy (Verified 06/19/18 11:51)


 











Past Medical History





- Social History


Smoking Status: Unknown if Ever Smoked


Family History: Arthritis, CAD, CVA, DM, Hyperlipidemia, Hypertension


Renal/ Medical History: Denies: Hx Ectopic Pregnancy, Hx Kidney Stones, Hx 

Ovarian Cysts, Hx Peritoneal Dialysis, Hx Pelvic Inflammatory Disease


Musculoskeltal Medical History: Reports Hx Musculoskeletal Deformity, Reports 

Hx Musculoskeletal Trauma - ankle fracture


Skin Medical History: Reports Hx Cellulitis


Traumatic Medical History: Reports: Hx Fractures - ankle


Past Surgical History: Reports: Hx Breast Surgery - Biopsy





- Immunizations


Immunizations up to date: Yes


Hx Diphtheria, Pertussis, Tetanus Vaccination: Yes - 2015





Review of Systems





- Review of Systems


-: Yes All other systems reviewed and negative





Physical Exam





- Vital signs


Vitals: 


 











Temp Pulse Resp BP Pulse Ox


 


 98.1 F   109 H  16   131/93 H  100 


 


 06/19/18 12:00  06/19/18 12:00  06/19/18 12:00  06/19/18 12:00  06/19/18 12:00














- Notes


Notes: 





GENERAL: alert, cooperative, nontoxic, no distress. 


HEAD: normocephalic, atraumatic 


EYES: conjunctiva pink without discharge, no external redness or swelling. 


EARS: no external swelling, no external redness 


NOSE: atraumatic, no external swelling 


MOUTH/THROAT: mucous membranes moist and pink, posterior pharynx without 

erythema, swelling, exudate. No trismus or drooling. 


NECK: soft, supple, full range of motion, no meningismus. 


CHEST: no distress, lungs clear and equal throughout. No wheezing, rales, 

rhonchi. 


CARDIAC: regular rate and rhythm, no murmur, normal capillary refill, normal 

pulses. No peripheral edema noted. 


ABDOMEN: soft, nontender, no pusatile mass. 


BACK: No CVA tenderness.  Tenderness to palpation of the left lumbar paraspinal 

muscles.  Also at the sciatic notch.  No midline tenderness step-offs or 

crepitus.  Limited range of motion of the low back secondary to pain.  No rash.


EXTREMITIES: full range of motion of all extremities. No redness, no swelling. 


NEURO: alert and oriented A&O x 3, no focal deficits, full range of motion of 

all extremities.  5 out of 5 flexion and extension of the lower extremities 

bilaterally.  Patellar and Achilles deep tendon reflexes are +2 bilaterally.  

Normal sensation with no saddle anesthesia.  Patient can dorsiflex the great 

toes bilaterally.


PYSCH: appropriate mood, affect. Patient is cooperative. 


SKIN: pink, warm, dry, no rash.








Course





- Re-evaluation


Re-evalutation: 





06/19/18 15:48


Patient is nontoxic-appearing with stable vitals.  She is here with complaints 

of left low back pain.  This pain is rather chronic in nature and she has been 

seen for this multiple times.  No fevers.  No abdominal pain.  No bowel or 

bladder dysfunction at this time.  She is not on blood thinners.  She has no 

sign or risk of cauda equina, epidural abscess/bleed, discitis, osteomyelitis, 

AAA, pyelonephritis.  She denies any abdominal pain currently.  She states that 

she was recently seen at [facility] was told that she has kidney stones.  She was 

concerned that her pain today was due to kidney stones.  Urinalysis is 

unremarkable with no signs of infection, no blood, negative pregnancy.  

Chemistries and CBC are unremarkable for significant acute findings.  CT the 

abdomen and pelvis shows small ovarian cysts with no significant acute 

findings.  Patient's pain is completely reproducible along the left lumbar 

paraspinal muscles and left sciatic notch and is likely musculoskeletal in 

nature.  There is no significant findings on her lumbar spine on CT.  At this 

point the patient will be discharged home with a prescription for Voltaren and 

Valium.  She states that she is feeling better after the medication she 

received here.  She does have an appointment scheduled with a new primary care 

doctor in the next few weeks.  She was instructed to keep this appointment as 

she may require an MRI if she continues to have the low back pain that she is 

experiencing.  She should follow-up sooner if she develops worsening pain, high 

fever, difficulty controlling bowels or bladder, or for any further concerns.





The patient is noted to have elevated blood pressure during today's emergency 

department visit.  The patient was informed of this finding.  The patient was 

instructed that this may be related to pre-hypertension and requires further 

evaluation with a primary care provider.  The patient has no hypertensive 

symptoms at this time.





The patient's emergency department workup and current diagnosis were explained 

to the patient and or family.  Follow-up instructions were provided.  

Medications if prescribed were discussed. Instructions for when to return to 

the emergency department including specific  worrisome symptoms were discussed 

with the patient and/or family.





- Vital Signs


Vital signs: 


 











Temp Pulse Resp BP Pulse Ox


 


 98.1 F   109 H  16   131/93 H  100 


 


 06/19/18 12:00  06/19/18 12:00  06/19/18 12:00  06/19/18 12:00  06/19/18 12:00














- Laboratory


Result Diagrams: 


 06/19/18 14:45





 06/19/18 14:45


Laboratory results interpreted by me: 


 











  06/19/18 06/19/18





  14:45 14:45


 


MCH  25.6 L 


 


RDW  16.1 H 


 


Total Protein   8.5 H














- Diagnostic Test


Radiology reviewed: Image reviewed, Reports reviewed - CT abdomen pelvis with 

small ovarian cyst, no other acute abnormalities.





Discharge





- Discharge


Clinical Impression: 


Low back pain


Qualifiers:


 Chronicity: chronic Back pain laterality: left Sciatica presence: without 

sciatica Qualified Code(s): M54.5 - Low back pain; G89.29 - Other chronic pain; 

G89.29 - Other chronic pain





Condition: Stable


Disposition: HOME, SELF-CARE


Instructions:  Low Back Pain ([facility]), Chronic Back Pain ([facility]), Ovarian Cyst ([facility])


Additional Instructions: 


Take medication as prescribed.  Follow-up with your new primary care doctor as 

scheduled in the next few weeks.  There were noted to have ovarian cysts on her 

CT today, he should have these reevaluated to ensure that they resolve.  Rest 

of your workup today was unremarkable for significant findings.  The pain you 

are experiencing is most likely musculoskeletal in nature.  Take medications as 

prescribed.  Follow-up sooner for worsening pain, fever, numbness, tingling, 

weakness, bowel or bladder dysfunction, or for any further concerns.





Your blood pressure was elevated during today's visit.  Have this rechecked 

with your doctor.





The medication you were prescribed today may cause drowsiness.  Do not drive or 

operate heavy machinery while taking this medication.


Prescriptions: 


Diazepam [Valium 5 mg Tablet] 5 mg PO QIDP PRN #15 tablet


 PRN Reason: 


Diclofenac Sodium [Voltaren 50 Mg Tablet.] 50 mg PO BID #20 tablet.


Forms:  Elevated Blood Pressure, Smoking Cessation Education


Referrals: 


[facility] [Provider Group] - Follow up as needed

## 2018-06-19 NOTE — RADIOLOGY REPORT ([facility])
EXAM DESCRIPTION:  CT ABD/PELVIS NO ORAL OR IV



COMPLETED DATE/TIME:  6/19/2018 3:17 pm



REASON FOR STUDY:  left low back pain, hx of kidney stones



COMPARISON:  None.



TECHNIQUE:  CT scan of the abdomen and pelvis performed without intravenous or oral contrast. Images 
reviewed with lung, soft tissue, and bone windows. Reconstructed coronal and sagittal MPR images revi
ewed. All images stored on PACS.

All CT scanners at this facility use dose modulation, iterative reconstruction, and/or weight based d
osing when appropriate to reduce radiation dose to as low as reasonably achievable (ALARA).

[facility]: Dose Right  [facility]: CareDose    [facility]: Dose Right    [facility]: Teradose 4D    [facility]: Smart Powderhook



RADIATION DOSE:  CT Rad equipment meets quality standard of care and radiation dose reduction techniq
ues were employed. CTDIvol: 18.1 mGy. DLP: 1100 mGy-cm.mGy.



LIMITATIONS:  None.



FINDINGS:  LOWER CHEST: No significant findings. No nodules or infiltrates.

NON-CONTRASTED LIVER, SPLEEN, ADRENALS: Evaluation limited by lack of IV contrast. No identified sign
ificant masses.

PANCREAS: No masses. No peripancreatic inflammatory changes.

GALLBLADDER: No identified stones by CT criteria. No inflammatory changes to suggest cholecystitis.

RIGHT KIDNEY AND URETER: No suspicious masses. Assessment limited by lack of IV contrast.   No signif
icant calcifications.   No hydronephrosis or hydroureter.

LEFT KIDNEY AND URETER: No suspicious masses. Assessment limited by lack of IV contrast.   No signifi
cant calcifications.   No hydronephrosis or hydroureter.

AORTA AND RETROPERITONEUM: No aneurysm. No retroperitoneal masses or adenopathy.

BOWEL AND PERITONEAL CAVITY: No obvious masses or inflammatory changes. No free fluid.

APPENDIX: Normal.

PELVIS, BLADDER, AND ABDOMINAL WALL:Urinary bladder is unremarkable.  An IUD is present.  Small adnex
al cysts are present

BONES: No significant findings.

OTHER: No other significant finding.



IMPRESSION:  Small ovarian cysts or follicles are present.  There are no acute findings in the abdome
n or pelvis that explain the patient's pain.



COMMENT:  Quality ID # [id number]: Final reports with documentation of one or more dose reduction techniques
 (e.g., Automated exposure control, adjustment of the mA and/or kV according to patient size, use of 
iterative reconstruction technique)



TECHNICAL DOCUMENTATION:  JOB ID:  [id number]

 [employer]- All Rights Reserved



Reading location - IP/workstation name: [email or web address]

## 2018-09-27 ENCOUNTER — HOSPITAL ENCOUNTER (EMERGENCY)
Dept: HOSPITAL 62 - ER | Age: 28
Discharge: HOME | End: 2018-09-27
Payer: MEDICAID

## 2018-09-27 VITALS — DIASTOLIC BLOOD PRESSURE: 84 MMHG | SYSTOLIC BLOOD PRESSURE: 141 MMHG

## 2018-09-27 DIAGNOSIS — F17.200: ICD-10-CM

## 2018-09-27 DIAGNOSIS — M54.41: ICD-10-CM

## 2018-09-27 DIAGNOSIS — G89.29: Primary | ICD-10-CM

## 2018-09-27 PROCEDURE — 96372 THER/PROPH/DIAG INJ SC/IM: CPT

## 2018-09-27 PROCEDURE — 99283 EMERGENCY DEPT VISIT LOW MDM: CPT

## 2018-09-27 NOTE — ER DOCUMENT REPORT
ED General Pain





- General


Chief Complaint: Low Back Pain


Stated Complaint: SEVERE BACK PAIN


Time Seen by Provider: 09/27/18 22:26


Notes: 





Patient is a 28-year-old female presenting to the emergency room complaining of 

lower back pain for 2 days.  Patient has an extensive history of lower back 

pain states she has an appointment with her primary care in hopes to get a 

referral to an orthopedic on October 2.  States she tried to go to the 

orthopedic with a referral from the emergency room and her insurance refused 

it.  States the pain is in her right and the left buttocks more so on the right 

side radiating down the back of her right leg.  Patient states pain is the same 

as the last time she was in the emergency room.  Patient denies urinary 

retention, incontinence of bowel or bladder.  Patient also denies IV drug use.  

Denies pain over spinal processes, pain is right paraspinal moving into her 

right buttocks.  No pain elicited over exam of left paraspinal or buttocks 

region patient states current pain is now only on the right side.  Patient 

denies fever, abdominal pain, flank pain, nausea, vomiting, shortness of breath.





Past medical history: Sciatic nerve pain


Medications: None


Allergies: None


Surgeries: Breast biopsy





Patient states she is an everyday smoker, denies illicit drug use, denies 

alcohol use.


TRAVEL OUTSIDE OF THE [place] IN LAST 30 DAYS: No





- Related Data


Allergies/Adverse Reactions: 


 





No Known Allergies Allergy (Verified 06/19/18 11:51)


 











Past Medical History





- General


Information source: Patient, Parent





- Social History


Smoking Status: Current Every Day Smoker


Chew tobacco use (# tins/day): No


Frequency of alcohol use: Occasional


Drug Abuse: None


Lives with: Family


Family History: Arthritis, CAD, CVA, DM, Hyperlipidemia, Hypertension


Patient has suicidal ideation: No


Patient has homicidal ideation: No


Renal/ Medical History: Denies: Hx Ectopic Pregnancy, Hx Kidney Stones, Hx 

Ovarian Cysts, Hx Peritoneal Dialysis, Hx Pelvic Inflammatory Disease


Musculoskeletal Medical History: Reports Hx Musculoskeletal Deformity, Reports 

Hx Musculoskeletal Trauma - ankle fracture


Skin Medical History: Reports Hx Cellulitis


Traumatic Medical History: Reports: Hx Fractures - ankle


Past Surgical History: Reports: Hx Breast Surgery - Biopsy





- Immunizations


Immunizations up to date: Yes


Hx Diphtheria, Pertussis, Tetanus Vaccination: Yes - 2015





Review of Systems





- Review of Systems


Constitutional: See HPI


EENT: No symptoms reported


Cardiovascular: See HPI


Respiratory: See HPI


Gastrointestinal: See HPI


Genitourinary: See HPI


Female Genitourinary: See HPI


Musculoskeletal: See HPI


Skin: No symptoms reported


Hematologic/Lymphatic: No symptoms reported


Neurological/Psychological: No symptoms reported





Physical Exam





- Vital signs


Vitals: 


 











Temp Pulse Resp BP Pulse Ox


 


 97.9 F   74   18   139/78 H  99 


 


 09/27/18 22:15  09/27/18 22:15  09/27/18 22:15  09/27/18 22:15  09/27/18 22:15














- Notes


Notes: 





GENERAL: Alert, interacts well. No acute distress.


HEAD: Normocephalic, atraumatic.


EYES: Pupils equal, round, and reactive to light. Extraocular movements intact.


ENT: Oral mucosa moist, tongue midline. 


NECK: Full range of motion. Supple. Trachea midline.


LUNGS: Clear to auscultation bilaterally, no wheezes, rales, or rhonchi. No 

respiratory distress.


HEART: Regular rate and rhythm. No murmur


ABDOMEN: Soft, non-tender. Non-distended. Bowel sounds present in all 4 

quadrants.


EXTREMITIES: Moves all 4 extremities spontaneously. No edema, normal radial and 

dorsalis pedis pulses bilaterally. No cyanosis.


BACK: no cervical, thoracic, lumbar midline tenderness. No saddle anesthesia, 

normal distal neurovascular exam.  Pain elicited palpation over right 

paraspinal into buttocks, pain also with right straight leg raise to 45 degrees.


NEUROLOGICAL: Alert and oriented x3. Normal speech. 


PSYCH: Normal affect, normal mood.


SKIN: Warm, dry, normal turgor. No rashes or lesions noted.








Course





- Re-evaluation


Re-evalutation: 


Pt. stated pain has resolved with treatments in ED. She stated she is going to 

keep her apt with PCP to then get an apt with ortho. Return precautions given. 


Not likely cauda equina syndrome due to patient presentation and history. 





- Vital Signs


Vital signs: 


 











Temp Pulse Resp BP Pulse Ox


 


 97.9 F   74   18   139/78 H  99 


 


 09/27/18 22:15  09/27/18 22:15  09/27/18 22:15  09/27/18 22:15  09/27/18 22:15














Discharge





- Discharge


Clinical Impression: 


Lower back pain


Qualifiers:


 Chronicity: chronic Back pain laterality: right Sciatica presence: with 

sciatica Sciatica laterality: sciatica of right side Qualified Code(s): M54.41 

- Lumbago with sciatica, right side; G89.29 - Other chronic pain; G89.29 - 

Other chronic pain





Condition: Stable


Disposition: HOME, SELF-CARE


Instructions:  Low Back Pain ([facility]), Muscle Strain ([facility]), Warm Packs ([facility])


Additional Instructions: 


As we discussed you have been seen and treated in the emergency room for 

sciatic nerve pain.  You must keep your appointment with your primary care to 

then get a referral to orthopedics.  As we discussed last time you got a 

referral from the emergency room your insurance declined it.  Take prescription 

medicines as needed.  Do stretches and apply heat as we discussed.  Return to 

the emergency room should you have any loss of bowel or bladder, urinary 

retention, numbness or tingling in her feet, or any other concerning symptoms


Prescriptions: 


Ketorolac Tromethamine [Toradol 10 mg Tablet] 10 mg PO Q8HP PRN #24 tablet


 PRN Reason: 


Cyclobenzaprine HCl [Flexeril 10 mg Tablet] 10 mg PO TIDP PRN #15 tab


 PRN Reason:

## 2018-10-14 ENCOUNTER — HOSPITAL ENCOUNTER (EMERGENCY)
Dept: HOSPITAL 62 - ER | Age: 28
Discharge: HOME | End: 2018-10-14
Payer: MEDICAID

## 2018-10-14 VITALS — SYSTOLIC BLOOD PRESSURE: 141 MMHG | DIASTOLIC BLOOD PRESSURE: 97 MMHG

## 2018-10-14 DIAGNOSIS — M79.644: ICD-10-CM

## 2018-10-14 DIAGNOSIS — S69.91XA: Primary | ICD-10-CM
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
## 2018-10-14 PROCEDURE — 2W3GX1Z IMMOBILIZATION OF RIGHT THUMB USING SPLINT: ICD-10-PCS

## 2018-10-14 PROCEDURE — 99283 EMERGENCY DEPT VISIT LOW MDM: CPT

## 2018-10-14 PROCEDURE — 73130 X-RAY EXAM OF HAND: CPT

## 2018-10-14 PROCEDURE — 29130 APPL FINGER SPLINT STATIC: CPT
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
## 2019-11-04 ENCOUNTER — HOSPITAL ENCOUNTER (EMERGENCY)
Dept: HOSPITAL 62 - ER | Age: 29
Discharge: HOME | End: 2019-11-04
Payer: MEDICAID

## 2019-11-04 VITALS — DIASTOLIC BLOOD PRESSURE: 56 MMHG | SYSTOLIC BLOOD PRESSURE: 108 MMHG

## 2019-11-04 DIAGNOSIS — F17.200: ICD-10-CM

## 2019-11-04 DIAGNOSIS — R11.2: ICD-10-CM

## 2019-11-04 DIAGNOSIS — R10.9: ICD-10-CM

## 2019-11-04 DIAGNOSIS — N39.0: Primary | ICD-10-CM

## 2019-11-04 DIAGNOSIS — D72.829: ICD-10-CM

## 2019-11-04 DIAGNOSIS — I10: ICD-10-CM

## 2019-11-04 DIAGNOSIS — R30.0: ICD-10-CM

## 2019-11-04 LAB
ADD MANUAL DIFF: NO
ALBUMIN SERPL-MCNC: 4.2 G/DL (ref 3.5–5)
ALP SERPL-CCNC: 71 U/L (ref 38–126)
ANION GAP SERPL CALC-SCNC: 11 MMOL/L (ref 5–19)
APPEARANCE UR: (no result)
APTT PPP: YELLOW S
AST SERPL-CCNC: 18 U/L (ref 14–36)
BASOPHILS # BLD AUTO: 0.1 10^3/UL (ref 0–0.2)
BASOPHILS NFR BLD AUTO: 0.4 % (ref 0–2)
BILIRUB DIRECT SERPL-MCNC: 0.1 MG/DL (ref 0–0.4)
BILIRUB SERPL-MCNC: 0.8 MG/DL (ref 0.2–1.3)
BILIRUB UR QL STRIP: NEGATIVE
BUN SERPL-MCNC: 10 MG/DL (ref 7–20)
CALCIUM: 9.6 MG/DL (ref 8.4–10.2)
CHLORIDE SERPL-SCNC: 108 MMOL/L (ref 98–107)
CO2 SERPL-SCNC: 25 MMOL/L (ref 22–30)
EOSINOPHIL # BLD AUTO: 0.1 10^3/UL (ref 0–0.6)
EOSINOPHIL NFR BLD AUTO: 0.8 % (ref 0–6)
ERYTHROCYTE [DISTWIDTH] IN BLOOD BY AUTOMATED COUNT: 15.3 % (ref 11.5–14)
GLUCOSE SERPL-MCNC: 80 MG/DL (ref 75–110)
GLUCOSE UR STRIP-MCNC: NEGATIVE MG/DL
HCT VFR BLD CALC: 36.7 % (ref 36–47)
HGB BLD-MCNC: 12.1 G/DL (ref 12–15.5)
KETONES UR STRIP-MCNC: 20 MG/DL
LYMPHOCYTES # BLD AUTO: 4.1 10^3/UL (ref 0.5–4.7)
LYMPHOCYTES NFR BLD AUTO: 27.2 % (ref 13–45)
MCH RBC QN AUTO: 27.4 PG (ref 27–33.4)
MCHC RBC AUTO-ENTMCNC: 33 G/DL (ref 32–36)
MCV RBC AUTO: 83 FL (ref 80–97)
MONOCYTES # BLD AUTO: 1.5 10^3/UL (ref 0.1–1.4)
MONOCYTES NFR BLD AUTO: 10.1 % (ref 3–13)
NEUTROPHILS # BLD AUTO: 9.3 10^3/UL (ref 1.7–8.2)
NEUTS SEG NFR BLD AUTO: 61.5 % (ref 42–78)
PH UR STRIP: 6 [PH] (ref 5–9)
PLATELET # BLD: 255 10^3/UL (ref 150–450)
POTASSIUM SERPL-SCNC: 3.6 MMOL/L (ref 3.6–5)
PROT SERPL-MCNC: 8 G/DL (ref 6.3–8.2)
PROT UR STRIP-MCNC: 100 MG/DL
RBC # BLD AUTO: 4.42 10^6/UL (ref 3.72–5.28)
SP GR UR STRIP: 1.02
TOTAL CELLS COUNTED % (AUTO): 100 %
UROBILINOGEN UR-MCNC: NEGATIVE MG/DL (ref ?–2)
WBC # BLD AUTO: 15.1 10^3/UL (ref 4–10.5)

## 2019-11-04 PROCEDURE — 85025 COMPLETE CBC W/AUTO DIFF WBC: CPT

## 2019-11-04 PROCEDURE — 87186 SC STD MICRODIL/AGAR DIL: CPT

## 2019-11-04 PROCEDURE — 87088 URINE BACTERIA CULTURE: CPT

## 2019-11-04 PROCEDURE — 80053 COMPREHEN METABOLIC PANEL: CPT

## 2019-11-04 PROCEDURE — 99284 EMERGENCY DEPT VISIT MOD MDM: CPT

## 2019-11-04 PROCEDURE — 81001 URINALYSIS AUTO W/SCOPE: CPT

## 2019-11-04 PROCEDURE — 87086 URINE CULTURE/COLONY COUNT: CPT

## 2019-11-04 PROCEDURE — 96372 THER/PROPH/DIAG INJ SC/IM: CPT

## 2019-11-04 PROCEDURE — 76770 US EXAM ABDO BACK WALL COMP: CPT

## 2019-11-04 PROCEDURE — 84703 CHORIONIC GONADOTROPIN ASSAY: CPT

## 2019-11-04 PROCEDURE — 36415 COLL VENOUS BLD VENIPUNCTURE: CPT

## 2019-11-04 NOTE — RADIOLOGY REPORT (SQ)
EXAM DESCRIPTION:  U/S RETROPERITON (RENAL/AORTA)



COMPLETED DATE/TIME:  11/4/2019 6:36 pm



REASON FOR STUDY:  right flank pain radiate to left



COMPARISON:  None.



TECHNIQUE:  Dynamic and static grayscale images acquired of the kidneys and bladder and recorded on P
ACS. Additional selected color Doppler and spectral images recorded.



LIMITATIONS:  None.



FINDINGS:  RIGHT KIDNEY: Normal size, 10.2 cm. Normal echogenicity. No solid or suspicious masses. No
 hydronephrosis. No calcifications.

LEFT KIDNEY:  Normal size, 11.7 cm. Normal echogenicity. No solid or suspicious masses. No hydronephr
osis. No calcifications.

BLADDER: No masses.  Bilateral ureteral jets are seen.

OTHER FINDINGS: No other significant finding.



IMPRESSION:  NORMAL RENAL AND BLADDER ULTRASOUND.



TECHNICAL DOCUMENTATION:  JOB ID:  [id number]

 2011 [employer]- All Rights Reserved



Reading location - IP/workstation name: [email or web address]

## 2019-11-04 NOTE — ER DOCUMENT REPORT
ED General





- General


Chief Complaint: Flank Pain


Stated Complaint: ABDOMINAL PAIN


Time Seen by Provider: 11/04/19 17:46


Primary Care Provider: 


DANIEL DASH NP [Primary Care Provider] - Follow up as needed


Mode of Arrival: Ambulatory


TRAVEL OUTSIDE OF THE [place] IN LAST 30 DAYS: No





- HPI


Notes: 





Patient is a 29-year-old female who presents emergency department for evaluation

of pain in her right flank.  She stated it started in the right side 2 days ago.

 She admits she had been very active, helping her mother move over the preceding

days.  She states that one point radiated down into her pelvis and across the 

left side of her abdomen.  She states she might perhaps have some small dysuria.

 She had a little bit of nausea with one episode of emesis yesterday.  Otherwise

she has had no fevers or chills.  Normal bowel movements.  No vaginal discharge.

 She states that moving seems to make it worse.  Is also worsened with deep 

breaths.  She denies any cough or shortness of breath.  She soaked in a hot tub 

and took some Aleve, which did improve her pain somewhat.  At the time of my 

interview, the patient had received Toradol via triage, she states she is now 

pain-free.





- Related Data


Allergies/Adverse Reactions: 


                                        





No Known Allergies Allergy (Verified 11/04/19 17:46)


   








Home Medications: None





Past Medical History





- General


Information source: Patient





- Social History


Smoking Status: Current Every Day Smoker


Chew tobacco use (# tins/day): No


Frequency of alcohol use: None


Drug Abuse: None


Family History: Arthritis, CAD, CVA, DM, Hyperlipidemia, Hypertension


Patient has suicidal ideation: No


Patient has homicidal ideation: No





- Past Medical History


Cardiac Medical History: Reports: Hx Hypertension


Renal/ Medical History: Denies: Hx Ectopic Pregnancy, Hx Kidney Stones, Hx 

Ovarian Cysts, Hx Peritoneal Dialysis, Hx Pelvic Inflammatory Disease


Musculoskeletal Medical History: Reports Hx Musculoskeletal Deformity, Reports 

Hx Musculoskeletal Trauma - ankle fracture


Skin Medical History: Reports Hx Cellulitis


Traumatic Medical History: Reports: Hx Fractures - ankle


Past Surgical History: Reports: Hx Breast Surgery - Biopsy





- Immunizations


Immunizations up to date: Yes


Hx Diphtheria, Pertussis, Tetanus Vaccination: Yes - 2015





Review of Systems





- Review of Systems


Constitutional: No symptoms reported


EENT: No symptoms reported


Cardiovascular: No symptoms reported


Respiratory: See HPI


Gastrointestinal: See HPI


Genitourinary: See HPI


Female Genitourinary: No symptoms reported


Musculoskeletal: See HPI


Skin: No symptoms reported


Neurological/Psychological: No symptoms reported





Physical Exam





- Vital signs


Vitals: 


                                        











Temp Pulse Resp BP Pulse Ox


 


 98.6 F   72   16   152/88 H  100 


 


 11/04/19 17:21  11/04/19 17:21  11/04/19 17:21  11/04/19 17:21  11/04/19 17:21














- Notes


Notes: 





Vital signs reviewed, please refer to chart. Head is normocephalic, atraumatic. 

Pupils equal round, reactive to light.  Neck is supple without meningismus.  

Heart is regular rate and rhythm.  Lungs are clear to auscultation bilaterally. 

Chest wall excursion is equal bilaterally, chest wall is nontender.  Abdomen is 

soft, nontender, normoactive bowel sounds throughout.  No CVA tenderness.  E

xtremities without cyanosis, clubbing. Posterior calves are nontender.  

Peripheral pulses are equal.  Skin is warm and dry.  Patient is awake, alert, 

neurological exam is nonfocal.





Course





- Re-evaluation


Re-evalutation: 





11/04/19 20:45


Patient presents emergency department for evaluation of right flank pain.  She 

states she had one episode of emesis and mild dysuria associated with this.  

Laboratory investigations were obtained and the patient was medicated with 

Toradol.  She is currently pain-free.  Her vital signs are unremarkable.  She 

does have a mild leukocytosis noted, but no significant bandemia is appreciated.

 Her urinalysis is pending at this time.  Her ultrasound failed to reveal any 

signs of hydronephrosis or other obstructive uropathy.  If her urinalysis is 

unremarkable, patient will be discharged home with prescription strength anti-

inflammatories and close follow-up.


11/04/19 21:13


Urinalysis does reveal significant infection.  Patient will be treated with 

antibiotics, given her first dose of Keflex here.  She is to follow-up with 

primary care, return to the ED with worsening.





- Vital Signs


Vital signs: 


                                        











Temp Pulse Resp BP Pulse Ox


 


 98.6 F   72   16   152/88 H  100 


 


 11/04/19 17:21  11/04/19 17:21  11/04/19 17:21  11/04/19 17:21  11/04/19 17:21














- Laboratory


Result Diagrams: 


                                 11/04/19 18:03





                                 11/04/19 18:03


Laboratory results interpreted by me: 


                                        











  11/04/19 11/04/19 11/04/19





  18:03 18:03 20:10


 


WBC  15.1 H  


 


RDW  15.3 H  


 


Absolute Neuts (auto)  9.3 H  


 


Absolute Monos (auto)  1.5 H  


 


Chloride   108 H 


 


Urine Protein    100 H


 


Urine Ketones    20 H


 


Urine Blood    LARGE H


 


Urine Nitrite (Reflex)    POSITIVE H


 


Leukocyte Esterase Rfl    LARGE H














Discharge





- Discharge


Clinical Impression: 


 Right flank pain, Urinary tract infection





Condition: Stable


Disposition: HOME, SELF-CARE


Instructions:  Antinausea Medication ([facility]), Cephalexin ([facility]), Urinary Tract 

Infection ([facility])


Additional Instructions: 


Rest, stay well-hydrated.  Take antibiotic as prescribed until gone.  If you 

develop fevers, vomiting, worsened pain, or any other new or concerning 

symptoms, return immediately to the emergency department for evaluation.  

Otherwise, follow-up with your primary care doctor this week.


Referrals: 


DANIEL DASH NP [Primary Care Provider] - Follow up as needed

## 2019-11-04 NOTE — ER DOCUMENT REPORT
ED Medical Screen (RME)





- General


Chief Complaint: Abdominal Pain


Stated Complaint: ABDOMINAL PAIN


Time Seen by Provider: 11/04/19 17:46


Primary Care Provider: 


DANIEL DASH NP [Primary Care Provider] - Follow up as needed


Mode of Arrival: Ambulatory


Information source: Patient


Notes: 





29-year-old female presents to ED for complaint of right flank pain 2 days ago t

hat went around to her pelvis and that was on her left side also.  She states 

she helped her mother move October 31 and first.  So she thought that the pain 

is just from movement but it is increased.  She states she did vomit last night 

she denies any diarrhea or fever.  She states she soaked in a tub thinking that 

would help yesterday and took some Aleve 1 helped to relieve her pain














I have greeted and performed a rapid initial assessment of this patient.  A 

comprehensive ED assessment and evaluation of the patient, analysis of test 

results and completion of medical decision making process will be conducted by 

an additional ED providers.


TRAVEL OUTSIDE OF THE [place] IN LAST 30 DAYS: No





- Related Data


Allergies/Adverse Reactions: 


                                        





No Known Allergies Allergy (Verified 11/04/19 17:46)


   











Past Medical History





- Past Medical History


Cardiac Medical History: Reports: Hx Hypertension


Renal/ Medical History: Denies: Hx Ectopic Pregnancy, Hx Kidney Stones, Hx 

Ovarian Cysts, Hx Peritoneal Dialysis, Hx Pelvic Inflammatory Disease


Musculoskeltal Medical History: Reports Hx Musculoskeletal Deformity, Reports Hx

Musculoskeletal Trauma - ankle fracture


Skin Medical History: Reports Hx Cellulitis


Traumatic Medical History: Reports: Hx Fractures - ankle


Past Surgical History: Reports: Hx Breast Surgery - Biopsy





- Immunizations


Immunizations up to date: Yes


Hx Diphtheria, Pertussis, Tetanus Vaccination: Yes - 2015





Physical Exam





- Vital signs


Vitals: 





                                        











Temp Pulse Resp BP Pulse Ox


 


 98.6 F   72   16   152/88 H  100 


 


 11/04/19 17:21  11/04/19 17:21  11/04/19 17:21  11/04/19 17:21  11/04/19 17:21














Course





- Vital Signs


Vital signs: 





                                        











Temp Pulse Resp BP Pulse Ox


 


 98.6 F   72   16   152/88 H  100 


 


 11/04/19 17:21  11/04/19 17:21  11/04/19 17:21  11/04/19 17:21  11/04/19 17:21














Doctor's Discharge





- Discharge


Referrals: 


DANIEL DASH NP [Primary Care Provider] - Follow up as needed

## 2020-11-30 ENCOUNTER — HOSPITAL ENCOUNTER (EMERGENCY)
Dept: HOSPITAL 62 - ER | Age: 30
Discharge: HOME | End: 2020-11-30
Payer: SELF-PAY

## 2020-11-30 VITALS — DIASTOLIC BLOOD PRESSURE: 72 MMHG | SYSTOLIC BLOOD PRESSURE: 136 MMHG

## 2020-11-30 DIAGNOSIS — L02.412: Primary | ICD-10-CM

## 2020-11-30 DIAGNOSIS — I10: ICD-10-CM

## 2020-11-30 PROCEDURE — 0H9CXZZ DRAINAGE OF LEFT UPPER ARM SKIN, EXTERNAL APPROACH: ICD-10-PCS | Performed by: EMERGENCY MEDICINE

## 2020-11-30 PROCEDURE — 87077 CULTURE AEROBIC IDENTIFY: CPT

## 2020-11-30 PROCEDURE — 87186 SC STD MICRODIL/AGAR DIL: CPT

## 2020-11-30 PROCEDURE — 87070 CULTURE OTHR SPECIMN AEROBIC: CPT

## 2020-11-30 PROCEDURE — 87075 CULTR BACTERIA EXCEPT BLOOD: CPT

## 2020-11-30 PROCEDURE — 99283 EMERGENCY DEPT VISIT LOW MDM: CPT

## 2020-11-30 PROCEDURE — 87205 SMEAR GRAM STAIN: CPT

## 2020-11-30 NOTE — ER DOCUMENT REPORT
ED General





- General


Chief Complaint: Abscess


Stated Complaint: ABSCESS


Time Seen by Provider: 11/30/20 09:48


Primary Care Provider: 


DANIEL DASH NP [Primary Care Provider] - Follow up as needed


TRAVEL OUTSIDE OF THE [place] IN LAST 30 DAYS: No





- HPI


Notes: 





Patient is a 31 y/o female who presents with an abscess to her left axilla that 

began about a week ago.  Patient denies fever, nausea, vomiting.  She reports a 

history of frequent abscesses in the past that have been treated with incision 

and drainage as well as antibiotics.  She denies any other medical problems.





- Related Data


Allergies/Adverse Reactions: 


                                        





No Known Allergies Allergy (Verified 11/04/19 17:46)


   











Past Medical History





- General


Information source: Patient





- Social History


Smoking Status: Unknown if Ever Smoked


Family History: Arthritis, CAD, CVA, DM, Hyperlipidemia, Hypertension


Patient has homicidal ideation: No





- Past Medical History


Cardiac Medical History: Reports: Hx Hypertension


Renal/ Medical History: Denies: Hx Ectopic Pregnancy, Hx Kidney Stones, Hx 

Ovarian Cysts, Hx Peritoneal Dialysis, Hx Pelvic Inflammatory Disease


Musculoskeletal Medical History: Reports Hx Musculoskeletal Deformity, Reports 

Hx Musculoskeletal Trauma - ankle fracture


Skin Medical History: Reports Hx Cellulitis


Traumatic Medical History: Reports: Hx Fractures - ankle


Past Surgical History: Reports: Hx Breast Surgery - Biopsy





- Immunizations


Immunizations up to date: Yes


Hx Diphtheria, Pertussis, Tetanus Vaccination: Yes - 2015





Review of Systems





- Review of Systems


Constitutional: No symptoms reported


EENT: No symptoms reported


Cardiovascular: No symptoms reported


Respiratory: No symptoms reported


Gastrointestinal: No symptoms reported


Genitourinary: No symptoms reported


Female Genitourinary: No symptoms reported


Musculoskeletal: No symptoms reported


Skin: See HPI


Hematologic/Lymphatic: No symptoms reported


Neurological/Psychological: No symptoms reported





Physical Exam





- Vital signs


Vitals: 


                                        











Temp Pulse Resp BP Pulse Ox


 


 98.6 F   97   16   136/72 H  100 


 


 11/30/20 09:24  11/30/20 09:24  11/30/20 09:24  11/30/20 09:24  11/30/20 09:24














- Notes


Notes: 





PHYSICAL EXAMINATION:





GENERAL: Well-appearing, well-nourished and in no acute distress.





HEAD: Atraumatic, normocephalic.





EYES:  sclera anicteric, conjunctiva are normal.





ENT: Moist mucous membranes.





NECK: Normal range of motion





LUNGS: Normal work of breathing





HEART: 2+ radial pulses bilaterally





EXTREMITIES: no pitting or edema.  No cyanosis.





NEUROLOGICAL: No focal neurological deficits. Moves all extremities 

spontaneously and on command.





PSYCH: Normal mood, normal affect.





SKIN: 3 cm abscess to the left axilla with no active drainage.  Area is 

fluctuant with a small area of induration inferiorly.  No overlying or 

surrounding erythema.  Palpable axillary lymph nodes.  Warm, Dry, normal turgor,

no rashes or lesions noted.





Course





- Re-evaluation


Re-evalutation: 





Patient is a 30-year-old female who presents with a left axillary abscess that 

began about 1 week ago.  Vital signs are stable and patient is afebrile.  On 

exam, 3 cm abscess to the left axilla with fluctuance and induration.  Incision 

and drainage performed.  Patient tolerated well with no complications.  Wound 

culture taken.  No concern for cellulitis or systemic infection as patient has 

no overlying or surrounding erythema and vitals are stable.  Patient will be 

discharged home with a prescription for Bactrim.  Return precautions and follow-

up instructions given.  Patient understands and is agreeable with the plan.








- Vital Signs


Vital signs: 


                                        











Temp Pulse Resp BP Pulse Ox


 


 98.6 F   97   16   136/72 H  100 


 


 11/30/20 09:24  11/30/20 09:24  11/30/20 09:24  11/30/20 09:24  11/30/20 09:24














Procedures





- Incision and Drainage


  ** Left


Type: Simple, Single


Anesthetic type: 1% Lidocaine


Blade size: 11


I&D procedure: Betadine prep applied


Incision Method: Incision made by scalpel


Amount/type of drainage: Moderate purulent and bloody drainage


Notes: 


3cm abscess to the left axilla.  Patient tolerated well with no complications. 














Discharge





- Discharge


Clinical Impression: 


 Abscess





Condition: Stable


Disposition: HOME, SELF-CARE


Instructions:  Abscess ([facility]), Trimethoprim-Sulfa ([facility])


Prescriptions: 


Sulfamethoxazole/Trimethoprim [Bactrim Ds Tablet] 1 tab PO BID 7 Days #14 tablet


Forms:  Return to Work


Referrals: 


DANIEL DASH NP [Primary Care Provider] - Follow up as needed

## 2021-01-13 ENCOUNTER — HOSPITAL ENCOUNTER (EMERGENCY)
Dept: HOSPITAL 62 - ER | Age: 31
Discharge: HOME | End: 2021-01-13
Payer: SELF-PAY

## 2021-01-13 VITALS — SYSTOLIC BLOOD PRESSURE: 148 MMHG | DIASTOLIC BLOOD PRESSURE: 94 MMHG

## 2021-01-13 DIAGNOSIS — I10: ICD-10-CM

## 2021-01-13 DIAGNOSIS — L02.411: Primary | ICD-10-CM

## 2021-01-13 PROCEDURE — 99283 EMERGENCY DEPT VISIT LOW MDM: CPT

## 2021-01-13 NOTE — ER DOCUMENT REPORT
HPI





- HPI


Time Seen by Provider: 01/13/21 14:58


Pain Level: 5


Notes: 





30-year-old female patient presents the emergency department concern for 

possible abscess to her right axillary area.  Patient reports history of similar

in the past.  She does report having to have these drained.  Denies any known 

history of MRSA.  Denies fever chills, states the abscess has been there for 

about 3 days.








- ROS


Systems Reviewed and Negative: Yes All other systems reviewed and negative





- REPRODUCTIVE


Reproductive: DENIES: Pregnant:





- DERM


Notes: 





abscess R axilla





Past Medical History





- General


Information source: Patient





- Social History


Smoking Status: Never Smoker


Family History: Arthritis, CAD, CVA, DM, Hyperlipidemia, Hypertension


Patient has homicidal ideation: No





- Past Medical History


Cardiac Medical History: Reports: Hx Hypertension


Renal/ Medical History: Denies: Hx Ectopic Pregnancy, Hx Kidney Stones, Hx 

Ovarian Cysts, Hx Peritoneal Dialysis, Hx Pelvic Inflammatory Disease


Musculoskeletal Medical History: Reports Hx Musculoskeletal Deformity, Reports 

Hx Musculoskeletal Trauma - ankle fracture


Skin Medical History: Reports Hx Cellulitis


Traumatic Medical History: Reports: Hx Fractures - ankle


Past Surgical History: Reports: Hx Breast Surgery - Biopsy





- Immunizations


Immunizations up to date: Yes


Hx Diphtheria, Pertussis, Tetanus Vaccination: Yes - 2015





[employer] Provider Document





- CONSTITUTIONAL


Notes: 





PHYSICAL EXAMINATION:





GENERAL: Well-appearing, well-nourished and in no acute distress.





HEAD: Atraumatic, normocephalic.





EYES: Pupils equal round extraocular movements intact,  conjunctiva are normal.





ENT: Nares patent





NECK: Normal range of motion





LUNGS: No respiratory distress





Musculoskeletal: Normal range of motion





NEUROLOGICAL:  Normal speech, normal gait. 





PSYCH: Normal mood, normal affect.





SKIN: Tender area with induration and fluctuance noted to right axillary area.  

No surrounding erythema.





- INFECTION CONTROL


TRAVEL OUTSIDE OF THE [place] IN LAST 30 DAYS: No





Course





- Re-evaluation


Re-evalutation: 





Abscess incised and drained, packing placed, patient tolerated well.  The 

patient's emergency department workup and current diagnosis were explained to 

e patient and or family.  Follow-up instructions were provided.  Medications if 

prescribed were discussed. Instructions for when to return to the emergency 

department including specific worrisome symptoms were discussed with the patient

and/or family.











- Vital Signs


Vital signs: 


                                        











Temp Pulse Resp BP Pulse Ox


 


 98.4 F   87   20   148/94 H  99 


 


 01/13/21 14:32  01/13/21 14:32  01/13/21 14:32  01/13/21 14:32  01/13/21 14:32














- Laboratory Results


Critical Laboratory Results Reviewed: No Critical Results





- Radiology Results


Critical Radiology Results Reviewed: No Critical Results





Procedures





- Incision and Drainage


  ** right axilla


Type: Simple


Anesthetic type: 1% Lidocaine


Blade size: 11


I&D procedure: Betadine prep applied


Incision Method: Incision made by scalpel





Discharge





- Discharge


Clinical Impression: 


 Abscess





Condition: Stable


Disposition: HOME, SELF-CARE


Additional Instructions: 


You were seen for an abscess that required drainage. Please clean this area with

soap and water twice daily and apply a topical antibiotic. Dress the area after 

each cleaning.  If packing was placed this must be removed in 48 hours.  Please 

return if you develop fever, vomiting, the pain at the site worsens, you notice 

spreading redness from the area, or you have any other symptoms that are 

concerning to you.





Prescriptions: 


Sulfamethoxazole/Trimethoprim [Bactrim Ds Tablet] 1 tab PO BID #14 tablet


Forms:  Return to Work, Treatment of Relative/Child


Referrals: 


DANIEL DASH NP [Primary Care Provider] - Follow up as needed